# Patient Record
Sex: FEMALE | Race: WHITE | NOT HISPANIC OR LATINO | Employment: UNEMPLOYED | URBAN - NONMETROPOLITAN AREA
[De-identification: names, ages, dates, MRNs, and addresses within clinical notes are randomized per-mention and may not be internally consistent; named-entity substitution may affect disease eponyms.]

---

## 2017-06-28 ENCOUNTER — APPOINTMENT (OUTPATIENT)
Dept: OCCUPATIONAL MEDICINE | Facility: OTHER | Age: 27
End: 2017-06-28

## 2017-06-28 PROCEDURE — 99000 SPECIMEN HANDLING OFFICE-LAB: CPT

## 2017-09-10 ENCOUNTER — HEALTH MAINTENANCE LETTER (OUTPATIENT)
Age: 27
End: 2017-09-10

## 2019-03-12 ENCOUNTER — TRANSFERRED RECORDS (OUTPATIENT)
Dept: HEALTH INFORMATION MANAGEMENT | Facility: HOSPITAL | Age: 29
End: 2019-03-12

## 2019-03-12 LAB — PAP-ABSTRACT: NORMAL

## 2019-11-08 ENCOUNTER — HEALTH MAINTENANCE LETTER (OUTPATIENT)
Age: 29
End: 2019-11-08

## 2020-02-12 ENCOUNTER — OFFICE VISIT (OUTPATIENT)
Dept: PSYCHIATRY | Facility: OTHER | Age: 30
End: 2020-02-12
Attending: PSYCHIATRY & NEUROLOGY
Payer: COMMERCIAL

## 2020-02-12 VITALS
HEART RATE: 67 BPM | HEIGHT: 65 IN | BODY MASS INDEX: 26.46 KG/M2 | OXYGEN SATURATION: 97 % | DIASTOLIC BLOOD PRESSURE: 68 MMHG | TEMPERATURE: 98.1 F | SYSTOLIC BLOOD PRESSURE: 116 MMHG

## 2020-02-12 DIAGNOSIS — F33.1 MAJOR DEPRESSIVE DISORDER, RECURRENT EPISODE, MODERATE (H): Primary | ICD-10-CM

## 2020-02-12 DIAGNOSIS — F41.1 GAD (GENERALIZED ANXIETY DISORDER): ICD-10-CM

## 2020-02-12 DIAGNOSIS — F43.10 PTSD (POST-TRAUMATIC STRESS DISORDER): ICD-10-CM

## 2020-02-12 PROCEDURE — 99203 OFFICE O/P NEW LOW 30 MIN: CPT | Performed by: PSYCHIATRY & NEUROLOGY

## 2020-02-12 PROCEDURE — G0463 HOSPITAL OUTPT CLINIC VISIT: HCPCS

## 2020-02-12 RX ORDER — LAMOTRIGINE 25 MG/1
TABLET ORAL
Qty: 90 TABLET | Refills: 3 | Status: SHIPPED | OUTPATIENT
Start: 2020-02-12 | End: 2020-03-25

## 2020-02-12 RX ORDER — HYDROXYZINE HYDROCHLORIDE 10 MG/1
TABLET, FILM COATED ORAL
Qty: 60 TABLET | Refills: 3 | Status: SHIPPED | OUTPATIENT
Start: 2020-02-12 | End: 2020-03-25 | Stop reason: DRUGHIGH

## 2020-02-12 ASSESSMENT — ANXIETY QUESTIONNAIRES
3. WORRYING TOO MUCH ABOUT DIFFERENT THINGS: NEARLY EVERY DAY
7. FEELING AFRAID AS IF SOMETHING AWFUL MIGHT HAPPEN: MORE THAN HALF THE DAYS
6. BECOMING EASILY ANNOYED OR IRRITABLE: NEARLY EVERY DAY
1. FEELING NERVOUS, ANXIOUS, OR ON EDGE: MORE THAN HALF THE DAYS
5. BEING SO RESTLESS THAT IT IS HARD TO SIT STILL: SEVERAL DAYS
2. NOT BEING ABLE TO STOP OR CONTROL WORRYING: MORE THAN HALF THE DAYS
GAD7 TOTAL SCORE: 15
IF YOU CHECKED OFF ANY PROBLEMS ON THIS QUESTIONNAIRE, HOW DIFFICULT HAVE THESE PROBLEMS MADE IT FOR YOU TO DO YOUR WORK, TAKE CARE OF THINGS AT HOME, OR GET ALONG WITH OTHER PEOPLE: EXTREMELY DIFFICULT

## 2020-02-12 ASSESSMENT — PAIN SCALES - GENERAL: PAINLEVEL: NO PAIN (0)

## 2020-02-12 ASSESSMENT — PATIENT HEALTH QUESTIONNAIRE - PHQ9
5. POOR APPETITE OR OVEREATING: MORE THAN HALF THE DAYS
SUM OF ALL RESPONSES TO PHQ QUESTIONS 1-9: 16

## 2020-02-12 NOTE — NURSING NOTE
"Chief Complaint   Patient presents with     Consult     Mental health.  Medication management.  Anxiety, depression, Bipolar.       Initial /68 (BP Location: Right arm, Patient Position: Sitting, Cuff Size: Adult Regular)   Pulse 67   Temp 98.1  F (36.7  C) (Tympanic)   Ht 1.651 m (5' 5\")   SpO2 97%   BMI 26.46 kg/m   Estimated body mass index is 26.46 kg/m  as calculated from the following:    Height as of this encounter: 1.651 m (5' 5\").    Weight as of 2/13/15: 72.1 kg (159 lb).  Medication Reconciliation: complete  JOSE MEDRANO LPN    "
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
## 2020-02-13 ASSESSMENT — ANXIETY QUESTIONNAIRES: GAD7 TOTAL SCORE: 15

## 2020-02-23 ENCOUNTER — HEALTH MAINTENANCE LETTER (OUTPATIENT)
Age: 30
End: 2020-02-23

## 2020-03-24 NOTE — PROGRESS NOTES
"Dania Cutler is a 29 year old female who is being evaluated via a billable telephone visit.      The patient has been notified of following:     \"This telephone visit will be conducted via a call between you and your physician/provider. We have found that certain health care needs can be provided without the need for a physical exam.  This service lets us provide the care you need with a short phone conversation.  If a prescription is necessary we can send it directly to your pharmacy.  If lab work is needed we can place an order for that and you can then stop by our lab to have the test done at a later time.    If during the course of the call the physician/provider feels a telephone visit is not appropriate, you will not be charged for this service.\"     Dania Cutler complains of  No chief complaint on file.      I have reviewed and updated the patient's Past Medical History, Social History, Family History and Medication List.    ALLERGIES  Amoxicillin; Ceclor  [cefaclor]; and Septra [bactrim]      Phone call duration: 10 minutes  [phone]      SUBJECTIVE / INTERIM HISTORY                                                                        Social- works at [employer].  time with her kids, cooking new recipes, more stuff in summer like going to the beach. 2 cats.  Children- 10, 8, 5, and 4 months  Last visit 2/12/20 which was initial visit:  Start Lamictal 25 mg daily for 2 weeks; then increase to 50 mg daily for 2 weeks; then increase to 75 mg daily. Hydroxyzine 10 - 20 mg up to 2 times daily as needed for anxiety.  - is liking Lamictal and feels is helping. Now up to 75 mg daily of Lamictal and feels is helping her mood.   - doesn't feel hydroxyzine helping. In fact like it's not doing anything (inlcluding no SEs)  - sister committed suicide 2 weeks prior to our initial visit. Interestingly having to be home with her kids during this pandemic and kids home from school has been a good thing " (more time to think, more time to grieve)  - works with Dr. Jurado for therapy but hasn't seen him since prior to our initial visit    SUBSTANCE USE- Alcohol abuse in past , doesn't drink now    SYMPTOMS-   Depression:  Sx are better: depressed mood, anhedonia, low energy, appetite changes, weight changes, poor concentration /memory, feeling worthless and excessive guilt  Mary/Hypomania: NOT currently but has had increased energy and inter-episode mood instability  Anxiety:  excessive worry, feeling fearful and nervous/overwhelmed    Trauma-related:  intrusive memories, trauma trigger psychological / physiological response, angry outbursts, startle response, hypervigilance and fear      MEDICAL ROS- no rash.  negative including no issues with constipation, diarrhea, abdominal pain, nausea. No issues with headaches    MEDICAL / SURGICAL HISTORY                pregnant [if applicable]--no     Patient Active Problem List   Diagnosis     Supervision of other high-risk pregnancy     Vaginal bleeding     Mild dysplasia of cervix     Noncompliant pregnant patient     Need for prophylactic vaccination and inoculation against influenza (FLU)     Need for Tdap vaccination     Low TSH level     Low-lying placenta     Drug use     ALLERGY   Amoxicillin; Ceclor  [cefaclor]; and Septra [bactrim]  MEDICATIONS                                                                                             Current Outpatient Medications   Medication Sig     hydrOXYzine (ATARAX) 10 MG tablet Take 1 to 2 tabs up to 2 times daily as needed for anxiety     lamoTRIgine (LAMICTAL) 25 MG tablet Take 1 tab daily for 2 weeks; then take 2 tabs daily for 2 weeks; then take 3 tabs daily     No current facility-administered medications for this visit.          PAST MEDICATION TRIALS    Zoloft (sertraline).   no older antidepresants.   no antipsychotics.   Xanax (alprazolam) and Ativan (lorazepam).   Benadryl (diphenhydramine), Unisom (doxylamine)  and Desyrel (trazadone). TRAZODONE caused nightmares       VITALS   There were no vitals taken for this visit.     PHQ9                     [unfilled]  LABS                                                                                                                         CBC RESULTS:   Recent Labs   Lab Test 06/04/14   WBC 7.8   HGB 11.5*   HCT 35.1   MCV 89.0   MCH 29.3   MCHC 32.9   RDW 15.0          MENTAL STATUS EXAM                                                                                       Mood was anxious. . Thought process, including associations, was unremarkable and thought content was devoid of suicidal and homicidal ideation and psychotic thought. No hallucinations. Insight was good. Judgment was intact and adequate for safety. Fund of knowledge was intact. Pt demonstrates no obvious problems with attention, concentration, language, recent or remote memory although these were not formally tested.     ASSESSMENT                                                                                                      HISTORICAL:  Initial psych note 2/12/2020         NOTES:      Dania is a 28 yo who notes primary issues of anxiety, irritability, and mood lability. She is working with Dr. Jurado for therapy - he as well as other therapists she has worked with have felt she likely has bipolar disorder. Dania and I today reviewed her history and we discussed I do feel she has symptoms and history that warrant dx of PTSD. I'm uncertain as to if she has bipolar disorder but she she does have symptoms for certain of depression. We reviewed medications and we ultimately agreed on mood stabilizer given she does have some sx of manic episodes and family hx of bipolar disorder. We agreed on Lamictal and reviewed the taper given risk of Gab [address] and hence needs to contact me and / or go in for any type of rash. Is helping! Hydroxyzine isn't however we started very low so I do think is  worth trying an increase in dose. She was agreeable to this.         TREATMENT RISK STATEMENT:  The risks, benefits, alternatives and potential adverse effects have been explained and are understood by the pt.  The pt agrees to the treatment plan with the ability to do so.   The pt knows to call the clinic for any problems or access emergency care if needed.        DIAGNOSES                        PTSD  Mood disorder unspecified (MDD vs. Bipolar disorder)      PLAN                                                                                                                    1)  MEDICATIONS:         --Continue Lamictal  75 mg daily. Increase hydroxyzine from 10 mg to 20 mg to instead of 25 mg tab with directions take 1-2 (25 mg to 50 mg) up      2)  THERAPY:  No Change    3)  LABS:  None    4)  PT MONITOR [call for probs]:  Worsening symptoms, SI/HI, SEs from meds    5)  REFERRALS [CD, medical, other]:  None    6)  RTC: ~1 month

## 2020-03-25 ENCOUNTER — VIRTUAL VISIT (OUTPATIENT)
Dept: PSYCHIATRY | Facility: OTHER | Age: 30
End: 2020-03-25
Attending: PSYCHIATRY & NEUROLOGY
Payer: COMMERCIAL

## 2020-03-25 DIAGNOSIS — F41.1 GAD (GENERALIZED ANXIETY DISORDER): Primary | ICD-10-CM

## 2020-03-25 DIAGNOSIS — F33.1 MAJOR DEPRESSIVE DISORDER, RECURRENT EPISODE, MODERATE (H): ICD-10-CM

## 2020-03-25 PROCEDURE — 99212 OFFICE O/P EST SF 10 MIN: CPT | Performed by: PSYCHIATRY & NEUROLOGY

## 2020-03-25 RX ORDER — MULTIPLE VITAMINS W/ MINERALS TAB 9MG-400MCG
1 TAB ORAL DAILY
COMMUNITY

## 2020-03-25 RX ORDER — LAMOTRIGINE 25 MG/1
75 TABLET ORAL DAILY
Qty: 90 TABLET | Refills: 3 | Status: SHIPPED | OUTPATIENT
Start: 2020-03-25 | End: 2021-04-29

## 2020-03-25 RX ORDER — HYDROXYZINE HYDROCHLORIDE 25 MG/1
TABLET, FILM COATED ORAL
Qty: 120 TABLET | Refills: 4 | Status: SHIPPED | OUTPATIENT
Start: 2020-03-25 | End: 2020-11-12

## 2020-03-25 ASSESSMENT — ANXIETY QUESTIONNAIRES
3. WORRYING TOO MUCH ABOUT DIFFERENT THINGS: SEVERAL DAYS
GAD7 TOTAL SCORE: 10
5. BEING SO RESTLESS THAT IT IS HARD TO SIT STILL: SEVERAL DAYS
2. NOT BEING ABLE TO STOP OR CONTROL WORRYING: SEVERAL DAYS
6. BECOMING EASILY ANNOYED OR IRRITABLE: NEARLY EVERY DAY
7. FEELING AFRAID AS IF SOMETHING AWFUL MIGHT HAPPEN: SEVERAL DAYS
1. FEELING NERVOUS, ANXIOUS, OR ON EDGE: MORE THAN HALF THE DAYS

## 2020-03-25 ASSESSMENT — PATIENT HEALTH QUESTIONNAIRE - PHQ9
5. POOR APPETITE OR OVEREATING: SEVERAL DAYS
SUM OF ALL RESPONSES TO PHQ QUESTIONS 1-9: 8

## 2020-03-25 NOTE — PROGRESS NOTES
"Dania Cutler is a 29 year old female who is being evaluated via a telephone visit.      The patient has been notified of following (by ESME Cleary LPN     \"We have found that certain health care needs can be provided without the need for a physical exam.  This service lets us provide the care you need with a short phone conversation.  If a prescription is necessary we can send it directly to your pharmacy.  If lab work is needed we can place an order for that and you can then stop by our lab to have the test done at a later time.    This telephone visit will be conducted via 3 way call with the you (the patient) , the physician/provider, and a me all on the line at the same time.  This allows your physician/provider to have the phone conversation with you while I will be taking notes for your medical record.  We will have full access to your [facility] medical record during this entire phone call.    Since this is like an office visit,  will bill your insurance company for this service.  Please check with your medical insurance if this type of telephone/virtual is covered . You may be responsible for the cost of this service if insurance coverage is denied.  The typical cost is $30 (10min), $59(11-20min) and $85 (21-30min)     If during the course of the call the physician/provider feels a telephone visit is not appropriate, you will not be charged for this service\"    Consent has been obtained for this service by care team member: yes.  See the scanned image in the medical record.    Total time of call between patient and provider was 10 minutes     Sheryl Calle (MD signature)  ===================================================    I have reviewed the note as documented above.  This accurately captures the substance of my conversation with the patient,                "

## 2020-03-26 ASSESSMENT — ANXIETY QUESTIONNAIRES: GAD7 TOTAL SCORE: 10

## 2020-04-28 ENCOUNTER — TELEPHONE (OUTPATIENT)
Dept: PSYCHIATRY | Facility: OTHER | Age: 30
End: 2020-04-28

## 2020-04-28 ENCOUNTER — APPOINTMENT (OUTPATIENT)
Dept: PSYCHIATRY | Facility: OTHER | Age: 30
End: 2020-04-28
Attending: PSYCHIATRY & NEUROLOGY
Payer: COMMERCIAL

## 2020-04-28 NOTE — TELEPHONE ENCOUNTER
Sheryl,  I have been trying to call this patient for 2 hours now. The voice mail comes on stating it is full. I am unable to leave a message. I can open the chart for you if you would like to try, but I won't be able to go through the questionnaires with the patient.

## 2020-06-19 PROBLEM — F31.9 BIPOLAR AFFECTIVE DISORDER, CURRENTLY ACTIVE (H): Status: ACTIVE | Noted: 2020-02-12

## 2020-06-19 PROBLEM — F41.1 GAD (GENERALIZED ANXIETY DISORDER): Status: ACTIVE | Noted: 2020-02-12

## 2020-06-19 PROBLEM — F33.1 MAJOR DEPRESSIVE DISORDER, RECURRENT EPISODE, MODERATE (H): Status: ACTIVE | Noted: 2020-02-12

## 2020-11-12 ENCOUNTER — AMBULATORY - HEALTHEAST (OUTPATIENT)
Dept: BEHAVIORAL HEALTH | Facility: CLINIC | Age: 30
End: 2020-11-12

## 2020-11-12 ENCOUNTER — TELEPHONE (OUTPATIENT)
Dept: BEHAVIORAL HEALTH | Facility: CLINIC | Age: 30
End: 2020-11-12

## 2020-11-12 ENCOUNTER — HOSPITAL ENCOUNTER (EMERGENCY)
Facility: CLINIC | Age: 30
Discharge: ANOTHER HOSPITAL | End: 2020-11-13
Attending: EMERGENCY MEDICINE | Admitting: EMERGENCY MEDICINE
Payer: COMMERCIAL

## 2020-11-12 DIAGNOSIS — R45.851 SUICIDAL IDEATION: ICD-10-CM

## 2020-11-12 LAB
AMPHETAMINES UR QL SCN: NEGATIVE
BARBITURATES UR QL: NEGATIVE
BENZODIAZ UR QL: NEGATIVE
CANNABINOIDS UR QL SCN: POSITIVE
COCAINE UR QL: NEGATIVE
ETHANOL UR QL SCN: NEGATIVE
HCG UR QL: NEGATIVE
OPIATES UR QL SCN: NEGATIVE

## 2020-11-12 PROCEDURE — 80320 DRUG SCREEN QUANTALCOHOLS: CPT | Performed by: EMERGENCY MEDICINE

## 2020-11-12 PROCEDURE — 80307 DRUG TEST PRSMV CHEM ANLYZR: CPT | Performed by: EMERGENCY MEDICINE

## 2020-11-12 PROCEDURE — C9803 HOPD COVID-19 SPEC COLLECT: HCPCS | Performed by: EMERGENCY MEDICINE

## 2020-11-12 PROCEDURE — 99285 EMERGENCY DEPT VISIT HI MDM: CPT | Mod: 25 | Performed by: EMERGENCY MEDICINE

## 2020-11-12 PROCEDURE — U0003 INFECTIOUS AGENT DETECTION BY NUCLEIC ACID (DNA OR RNA); SEVERE ACUTE RESPIRATORY SYNDROME CORONAVIRUS 2 (SARS-COV-2) (CORONAVIRUS DISEASE [COVID-19]), AMPLIFIED PROBE TECHNIQUE, MAKING USE OF HIGH THROUGHPUT TECHNOLOGIES AS DESCRIBED BY CMS-2020-01-R: HCPCS | Performed by: EMERGENCY MEDICINE

## 2020-11-12 PROCEDURE — 99285 EMERGENCY DEPT VISIT HI MDM: CPT | Performed by: EMERGENCY MEDICINE

## 2020-11-12 PROCEDURE — 90791 PSYCH DIAGNOSTIC EVALUATION: CPT

## 2020-11-12 PROCEDURE — 81025 URINE PREGNANCY TEST: CPT | Performed by: EMERGENCY MEDICINE

## 2020-11-12 RX ORDER — GABAPENTIN 300 MG/1
600 CAPSULE ORAL 3 TIMES DAILY
COMMUNITY
End: 2021-04-29

## 2020-11-13 VITALS
SYSTOLIC BLOOD PRESSURE: 121 MMHG | RESPIRATION RATE: 16 BRPM | TEMPERATURE: 96.7 F | DIASTOLIC BLOOD PRESSURE: 78 MMHG | HEART RATE: 76 BPM | OXYGEN SATURATION: 100 %

## 2020-11-13 LAB
LABORATORY COMMENT REPORT: NORMAL
SARS-COV-2 RNA SPEC QL NAA+PROBE: NEGATIVE
SARS-COV-2 RNA SPEC QL NAA+PROBE: NORMAL
SPECIMEN SOURCE: NORMAL
SPECIMEN SOURCE: NORMAL

## 2020-11-13 RX ORDER — GABAPENTIN 600 MG/1
600 TABLET ORAL 3 TIMES DAILY
Status: DISCONTINUED | OUTPATIENT
Start: 2020-11-13 | End: 2020-11-13 | Stop reason: HOSPADM

## 2020-11-13 NOTE — ED NOTES
ED to Behavioral Floor Handoff    SITUATION  Dania Cutler is a 30 year old female who speaks English and lives in a home with minor children only The patient arrived in the ED by private car from home with a complaint of Suicidal (intrusive thoughts of suicide since her sister completed suicide this past January and pt found her body; pt says she is afraid she might hurt herself impulsively but doesn't want to b/c she has 4 kids)  .The patient's current symptoms started/worsened 11 months ago and during this time the symptoms have increased.   In the ED, pt was diagnosed with   Final diagnoses:   Suicidal ideation        Initial vitals were: BP: 123/67  Pulse: 103  Temp: 97.6  F (36.4  C)  Resp: 14  SpO2: 99 %   --------  Is the patient diabetic? No   If yes, last blood glucose? --     If yes, was this treated in the ED? --  --------  Is the patient inebriated (ETOH) No or Impaired on other substances? No  MSSA done? N/A  Last MSSA score: --    Were withdrawal symptoms treated? N/A  Does the patient have a seizure history? No. If yes, date of most recent seizure--  --------  Is the patient patient experiencing suicidal ideation? reports the following suicide factors: suicidal thoughts with thoughts to cut throat    Homicidal ideation? denies current or recent homicidal ideation or behaviors.    Self-injurious behavior/urges? denies current or recent self injurious behavior or ideation.  ------  Was pt aggressive in the ED No  Was a code called No  Is the pt now cooperative? Yes  -------  Meds given in ED: Medications - No data to display   Family present during ED course? No  Family currently present? No    BACKGROUND  Does the patient have a cognitive impairment or developmental disability? No  Allergies:   Allergies   Allergen Reactions     Amoxicillin      Ceclor  [Cefaclor]      Septra [Bactrim]      Sulfamethoxazole W/Trimethoprim      Bactrim   .   Social demographics are   Social History     Socioeconomic  History     Marital status:      Spouse name: None     Number of children: None     Years of education: None     Highest education level: None   Occupational History     None   Social Needs     Financial resource strain: None     Food insecurity     Worry: None     Inability: None     Transportation needs     Medical: None     Non-medical: None   Tobacco Use     Smoking status: Former Smoker     Types: Cigarettes     Smokeless tobacco: Never Used     Tobacco comment: 1/2+  ppd   Substance and Sexual Activity     Alcohol use: No     Drug use: Yes     Types: Marijuana     Sexual activity: Yes     Partners: Male     Birth control/protection: Condom     Comment: previously on Depo-Provera   Lifestyle     Physical activity     Days per week: None     Minutes per session: None     Stress: None   Relationships     Social connections     Talks on phone: None     Gets together: None     Attends Sikhism service: None     Active member of club or organization: None     Attends meetings of clubs or organizations: None     Relationship status: None     Intimate partner violence     Fear of current or ex partner: None     Emotionally abused: None     Physically abused: None     Forced sexual activity: None   Other Topics Concern     Parent/sibling w/ CABG, MI or angioplasty before 65F 55M? No   Social History Narrative     None        ASSESSMENT  Labs results   Labs Ordered and Resulted from Time of ED Arrival Up to the Time of Departure from the ED   DRUG ABUSE SCREEN 6 CHEM DEP URINE ([facility]) - Abnormal; Notable for the following components:       Result Value    Cannabinoids Qual Urine Positive (*)     All other components within normal limits   HCG QUALITATIVE URINE      Imaging Studies: No results found for this or any previous visit (from the past 24 hour(s)).   Most recent vital signs /67   Pulse 92   Temp 97.6  F (36.4  C) (Tympanic)   Resp 14   SpO2 97%    Abnormal labs/tests/findings requiring  intervention:---   Pain control: pt had none  Nausea control: pt had none    RECOMMENDATION  Are any infection precautions needed (MRSA, VRE, etc.)? No If yes, what infection? --  ---  Does the patient have mobility issues? independently. If yes, what device does the pt use? ---  ---  Is patient on 72 hour hold or commitment? No If on 72 hour hold, have hold and rights been given to patient? No  Are admitting orders written if after 10 p.m. ?No  Tasks needing to be completed:---     Dasia Fuentes, RN    [phone] [place] [facility]   [phone]
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
## 2020-11-13 NOTE — ED NOTES
Dania Cutler  November 12, 2020    Pt being voluntarily hospitalized for suicidal urges and increased depression and anxiety, no history of attempts, increasing urges to cut her throat, seeking help voluntarily, denies violent ideation, history of hitting her head, denies recent SIB.    For additional details see full DEC assessment.       Herman Latham
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
## 2020-11-19 ENCOUNTER — COMMUNICATION - HEALTHEAST (OUTPATIENT)
Dept: SCHEDULING | Facility: CLINIC | Age: 30
End: 2020-11-19

## 2020-11-20 ENCOUNTER — COMMUNICATION - HEALTHEAST (OUTPATIENT)
Dept: BEHAVIORAL HEALTH | Facility: CLINIC | Age: 30
End: 2020-11-20

## 2020-11-20 ENCOUNTER — COMMUNICATION - HEALTHEAST (OUTPATIENT)
Dept: SCHEDULING | Facility: CLINIC | Age: 30
End: 2020-11-20

## 2020-12-06 ENCOUNTER — HEALTH MAINTENANCE LETTER (OUTPATIENT)
Age: 30
End: 2020-12-06

## 2021-04-27 ENCOUNTER — HOSPITAL ENCOUNTER (OUTPATIENT)
Dept: BEHAVIORAL HEALTH | Facility: CLINIC | Age: 31
Discharge: HOME | End: 2021-04-27
Attending: FAMILY MEDICINE | Admitting: FAMILY MEDICINE
Payer: COMMERCIAL

## 2021-04-27 PROCEDURE — 90791 PSYCH DIAGNOSTIC EVALUATION: CPT | Mod: 95 | Performed by: COUNSELOR

## 2021-04-27 ASSESSMENT — COLUMBIA-SUICIDE SEVERITY RATING SCALE - C-SSRS
6. HAVE YOU EVER DONE ANYTHING, STARTED TO DO ANYTHING, OR PREPARED TO DO ANYTHING TO END YOUR LIFE?: NO
TOTAL  NUMBER OF INTERRUPTED ATTEMPTS LIFETIME: YES
TOTAL  NUMBER OF ABORTED OR SELF INTERRUPTED ATTEMPTS PAST 3 MONTHS: NO
TOTAL  NUMBER OF INTERRUPTED ATTEMPTS PAST 3 MONTHS: 1
ATTEMPT LIFETIME: NO
4. HAVE YOU HAD THESE THOUGHTS AND HAD SOME INTENTION OF ACTING ON THEM?: YES
5. HAVE YOU STARTED TO WORK OUT OR WORKED OUT THE DETAILS OF HOW TO KILL YOURSELF? DO YOU INTEND TO CARRY OUT THIS PLAN?: YES
5. HAVE YOU STARTED TO WORK OUT OR WORKED OUT THE DETAILS OF HOW TO KILL YOURSELF? DO YOU INTEND TO CARRY OUT THIS PLAN?: YES
3. HAVE YOU BEEN THINKING ABOUT HOW YOU MIGHT KILL YOURSELF?: YES
ATTEMPT PAST THREE MONTHS: NO
4. HAVE YOU HAD THESE THOUGHTS AND HAD SOME INTENTION OF ACTING ON THEM?: YES
6. HAVE YOU EVER DONE ANYTHING, STARTED TO DO ANYTHING, OR PREPARED TO DO ANYTHING TO END YOUR LIFE?: NO
TOTAL  NUMBER OF INTERRUPTED ATTEMPTS PAST 3 MONTHS: NO
2. HAVE YOU ACTUALLY HAD ANY THOUGHTS OF KILLING YOURSELF LIFETIME?: YES
TOTAL  NUMBER OF ABORTED OR SELF INTERRUPTED ATTEMPTS PAST LIFETIME: YES
1. IN THE PAST MONTH, HAVE YOU WISHED YOU WERE DEAD OR WISHED YOU COULD GO TO SLEEP AND NOT WAKE UP?: YES

## 2021-04-27 NOTE — PROGRESS NOTES
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 911 or go to my nearest emergency department.     I helped develop this safety plan and agree to use it when needed.  I have been given a copy of this plan.        Today s date:  4/27/2021  Adapted from Safety Plan Template 2008 Vida Roque and Carroll Coyle is reprinted with the express permission of the authors.  No portion of the Safety Plan Template may be reproduced without the express, written permission.  You can contact the authors at [email or web address] or [email or web address]

## 2021-04-27 NOTE — PATIENT INSTRUCTIONS
Tuesday, April 27, 2021  Dania Cutler  [address] [address]    Dear Dania,  This is a reminder that an appointment for mental health services has been scheduled for you.  Please contact your insurance company directly to verify coverage, eligibility, payment, and  benefit information.    Appointment Date: 4/28/2021  Appointment Time: 11:00 AM  Location: [facility]  Xenia Khan  [address] [place] [address] [address] [place], [place] [address] [phone]    For directions and/or questions regarding the appointment, please contact the clinic or provider  directly at the phone number listed above.    Your appointment is scheduled outside of [facility]. Behavioral Healthcare  Providers ([facility]) maintains an extensive network of licensed behavioral health providers to  connect patients with the services they need. We do not charge providers a fee to participate  in our referral network. We match patients with providers based on a patient s specific  treatment needs, insurance coverage, and location. Our first effort will be to refer you to a  provider within your care system and will utilize providers outside of your care system as  needed.    Sincerely,  [facility] Behavioral Health Access Staff  [facility]  [address] [place] [id number]  [phone]        Welcome to the Adult Mental Health Outpatient Programs. Thank you for choosing us at [facility]!     Managing mental health symptoms while balancing life stressors can be a struggle. Our mental health programming will provide you the therapy, education, skills and support needed to improve your well-being and to live a healthy and more manageable lifestyle.  After completing the Diagnostic Assessment, you will receive a recommendation for a level of care or specialty service that is right for you. Our Outpatient Mental Health programs are all group-based and allow you to meet with peers who are  trying to manage similar symptoms or life circumstances in a safe and therapeutic setting.  Program Recommendations for: Day Treatment  You will be scheduled to join the following program: Adult Day Treatment Program  You will be in the follow group /track:  Waitlist  Please attend:  Choose an item.  at:  Choose an item.  Start date:  Program staff will contact you directly with a start date.   What will happen next?      You will receive a series of calls from our [facility] providers and/or schedulers to prepare you for your program participation.    1. Admission Call: Program staff will contact you after your diagnostic assessment to provide a brief check in and to complete the admission process. We will ask you about concerns that may need to be addressed right away. This could include: personal safety, insurance clarification, technology access, or another concern you may have. This call may occur days in advance or right before your first scheduled group.    2. Physical Health Screen Call:  A nurse will contact you either prior to admission or during your first week in the program to ask a few required physical health screening questions and discuss concerns as needed.   3. Provider/Scheduling Call: Program staff may also call to schedule an individual appointment with a psychiatrist or psychologist (therapist). This will depend on your needs and may be required for the program that was recommended for you. This program requirement does NOT replace your follow up with your community provider.  However, it is important that you do NOT see your community psychiatric provider on the same day that you see the program psychiatrist. If you do, this could result in a denial from your insurance. Please let us know if you have any concerns and we will help you.   4. Disability or FMLA paperwork requests: Please coordinate with your community provider to complete paperwork. This will be easiest for you. Most of  "the time, they want the same provider to follow you during your time off. If you do not have a community provider, please schedule an appointment with one as soon as possible. The Partial Hospitalization Program provider may assist with paperwork if you have not already established care in the community. However, this is a short-term program and responsibility for paperwork must transfer to another provider when you discharge from the Partial Hospitalization Program. We do NOT have a provider to complete paperwork in Adult Day Treatment, Adult Dual Disorder Program or 55+ Program at this time.   5. WAIT LIST: If you are placed on the Wait List following your diagnostic assessment, you will receive a phone call within a few days to discuss a tentative start date and plan.  Please contact us at the phone number listed below at any time with additional questions or if you are choosing to be removed from the Wait List.   What if I still have questions or cannot attend as planned? :  Adult Day Treatment [phone].     We hope to be able to answer your questions during the admission call. You can also reach out to us by contacting the program directly at:  Adult Day Treatment [phone].     Sincerely,   Your Outpatient Adult Mental Health Program Team    MY COPING PLAN FOR SAFETY     PATIENT'S NAME:    Dania Cutler  MRN:                           [id number]     SAFETY PLAN:     Step 1: Warning signs / cues (Thoughts, images, mood, situation, behavior) that a crisis may be developing:     ? Thoughts: \"I don't matter\", \"People would be better off without me\", \"I'm a burden\", \"I can't do this anymore\", \"I just want this to end\" and \"Nothing makes it better\"  ? Images: obsessive thoughts of death or dying: and flashbacks  ? Thinking Processes: ruminations (can't stop thinking about my problems):racing thoughts, intrusive thoughts (bothersome, unwanted thoughts that come out of nowhere):  highly critical and negative " "thoughts:  disorganized thinking and paranoia:   ? Mood: worsening depression, hopelessness, helplessness, intense anger, intense worry, agitation and mood swings  ? Behaviors: isolating/withdrawing , using drugs, can't stop crying, impulsive, reckless behaviors (acting without thinking):saying good-bye, aggression, not taking care of myself, not sleeping enough and increasing frequency and duration of dissociation  ? Situations: relationship problems, trauma  and financial stress   ?    Step 2: Coping strategies - Things I can do to take my mind off of my problems without contacting another person (relaxation technique, physical activity):     ? Distress Tolerance Strategies:  arts and crafts: meditate and watch tv.  ? Physical Activities: go for a walk and meditation  ? Focus on helpful thoughts:  \"This is temporary\", \"I will get through this\" and \"It always passes\"     Step 3: People and social settings that provide distraction:                 Name: Children and boyfriend, but he is going through a lot of mental health stuff also                                 park and camping and swimming            Step 4: Remind myself of people and things that are important to me and worth living for:  My children     Step 5: When I am in crisis, I can ask these people to help me use my safety plan:                 Name: Cortes ruano           Phone: [phone]        Step 6: Making the environment safe:      ? remove drugs and remove things I could use to hurt myself:     Step 7: Professionals or agencies I can contact during a crisis:     ? Suicide Prevention Lifeline: 1-099-223-TALK (8481)  ? Crisis Text Line Service (available 24 hours a day, 7 days a week): Text [place] to [address]1939  ? Call  **CRISIS (059953) from a cell phone to talk to a team of professionals who can help you.          Crisis Services By [facility]: Phone Number:   Amy     [phone]   [place]    [phone]   [place]    [phone]   [place]" [phone]   [place]    [phone]   [place] [phone]   [place]     [phone]      ? Call 911 or go to my nearest emergency department.             I helped develop this safety plan and agree to use it when needed.  I have been given a copy of this plan.          Today s date:  4/27/2021  Adapted from Safety Plan Template 2008 Vida Roque and Carroll Cyole is reprinted with the express permission of the authors.  No portion of the Safety Plan Template may be reproduced without the express, written permission.  You can contact the authors at [email or web address] or [email or web address]

## 2021-04-28 ASSESSMENT — PATIENT HEALTH QUESTIONNAIRE - PHQ9: SUM OF ALL RESPONSES TO PHQ QUESTIONS 1-9: 21

## 2021-04-28 NOTE — PROGRESS NOTES
Dania is a 30 year old who is being evaluated via a billable video visit.      How would you like to obtain your AVS? MyChart  If the video visit is dropped, the invitation should be resent by: Send to e-mail at: [email or web address]  Will anyone else be joining your video visit? No    Video Start Time: 1100  Patient is new to our clinic, here to establish care.   Requesting medications to help her wean off from Marijuana so that she can attend treatment at an outpatient facility in one month.     Subjective   Dania is a 30 year old who presents for the following health issues  HPI   Cannaboid hyperemesis syndrome: Dania is a generally healthy 30 year old with a complex mental health history. She has seen multiple people recently for medical treatment to help her stop smoking marijuana. She is currently unemployed. She lives with her boyfriend and her 4 children. She has a lot going on in her life. She found her sister  in January after a suicide. She smokes marijuana daily, in fact if she goes for more than 2 hours without smoking, she becomes very ill and cannot stop throwing up. She wants to quit smoking marijuana. She completed intake for a treatment center 2 days ago and will be able to start outpatient treatment in 1 month. Her goal is to be done smoking marijuana in one month. She feels like she needs medication (Ativan and Zofran) to help her with this. She is frustrated because she has seen many different people and no one is willing to help her.     PTSD/Generalized Anxiety Disorder:History of trauma related to sister's suicide. General anxiety for as long as she can remember. Will be seeing a therapist twice weekly, does not have a psychiatrist currently. Is not currently taking any medications. Medications tried in the past are Buspar, Lamictal, Gabapentin, Hydroxyzine, Zoloft. All of which have not been helpful.     Review of Systems   Constitutional, HEENT, cardiovascular, pulmonary, gi and gu  systems are negative, except as otherwise noted.  *Crying during interview  Objective         Vitals:  No vitals were obtained today due to virtual visit.    Physical Exam   GENERAL: Healthy, alert and no distress-sad  EYES: Eyes grossly normal to inspection.  No discharge or erythema, or obvious scleral/conjunctival abnormalities.  RESP: No audible wheeze, cough, or visible cyanosis.  No visible retractions or increased work of breathing.    SKIN: Visible skin clear. No significant rash, abnormal pigmentation or lesions.  NEURO: Cranial nerves grossly intact.  Mentation and speech appropriate for age.  PSYCH: Mentation appears normal, affect normal/bright, judgement and insight intact, normal speech and appearance well-groomed.    No Diagnostics completed today.     Video-Visit Details    Type of service:  Video Visit    Video End Time:1132 AM    Originating Location (pt. Location): Home    Distant Location (provider location):  Provider's home    Platform used for Video Visit: Votizen     Assessment & Plan     1.Cannabinoid hyperemesis syndrome  2.PTSD  3.Generalized Anxiety Disorder    First, will have telephone conference with Jag Boone regarding treatment plans for Dania. Next, I will collaborate with Psychiatry colleagues on possible evidence based protocols to treat Cannabinoid hyperemesis. Next, I will relay the information learned to Dania. Next, she will follow up in clinic per protocol guidelines. She verbalizes understanding of the plan.    West J Emerg Med  . 2018 Mar;19(2):380-386. doi: 10.5811/westjem.2017.11.30176. Epub 2017 Nov 8.   Results: Per the consensus guideline, treatment should focus on symptom relief and education on the need for cannabis cessation. Capsaicin is a readily available topical preparation that is reasonable to use as first-line treatment. Antipsychotics including haloperidol and olanzapine have been reported to provide complete symptom relief in limited case studies.  Conventional antiemetics including antihistamines, serotonin antagonists, dopamine antagonists and benzodiazepines may have limited effectiveness. Emergency physicians should avoid opioids if the diagnosis of CHS is certain and educate patients that cannabis cessation is the only intervention that will provide complete symptom relief.   Conclusion: An expert consensus treatment guideline is provided to assist with diagnosis and appropriate treatment of CHS. Clinicians and public health officials should identity and treat CHS patients with strategies that decrease exposure to opioids, minimize use of healthcare resources, and maximize patient safety.  BERNARDO Mcgill CNP  Addendum 05/06/2021 5859:Collaboration with Addiction. Discussion ensued regarding evidenced based protocol to treat CHS. Dania's symptoms do not completely fit the paradigm when reviewing the article HTTPS://pubmed.ncbi.nlm.nih.gov/45110739/  It's likely Dania could be an atypical presentation. Patient's often fall into cyclical GI symptoms; therefor a GI referral would not be a bad idea. Amitriptyline has shown positive effects in some patients. Initial treatment should be Hot showers which are often very effective. Capsaicin topically prn to abdomen has shown to be very effective as well (Rx sent).  Many times patients can often be treated as inpatient first especially if Dania's hydration status becomes compromised. There certainly could be other things happening here, however there are certainly correlations to the CHS. I will call Dania and share this information. An internal referral to an addiction fellow will be sent forward as well. Dania should follow up weekly on her status.   BERNARDO Mcgill CNP  Addendum 05/10/2021 3760: I was able to talk with Dania today about her plan. She is going to start Amitriptyline daily at bedtime. She will also implement hot showers and Capcaisin along with daily outpatient treatment classed  with the end goal to stop smoking marijuana. Finally, she will see GI in consult and follow up in our clinic in one week. She verbalizes understanding of the plan.   BERNARDO Mcgill, CNP

## 2021-04-29 ENCOUNTER — TELEPHONE (OUTPATIENT)
Dept: BEHAVIORAL HEALTH | Facility: CLINIC | Age: 31
End: 2021-04-29

## 2021-04-29 ENCOUNTER — VIRTUAL VISIT (OUTPATIENT)
Dept: FAMILY MEDICINE | Facility: CLINIC | Age: 31
End: 2021-04-29
Payer: COMMERCIAL

## 2021-04-29 DIAGNOSIS — F43.10 PTSD (POST-TRAUMATIC STRESS DISORDER): ICD-10-CM

## 2021-04-29 DIAGNOSIS — F12.90 CANNABINOID HYPEREMESIS SYNDROME: Primary | ICD-10-CM

## 2021-04-29 DIAGNOSIS — F41.1 GENERALIZED ANXIETY DISORDER: ICD-10-CM

## 2021-04-29 DIAGNOSIS — R11.2 CANNABINOID HYPEREMESIS SYNDROME: Primary | ICD-10-CM

## 2021-04-29 PROCEDURE — 99203 OFFICE O/P NEW LOW 30 MIN: CPT | Mod: 95 | Performed by: NURSE PRACTITIONER

## 2021-04-29 NOTE — PATIENT INSTRUCTIONS

## 2021-04-29 NOTE — TELEPHONE ENCOUNTER
Writer contact patient regarding her placement on the day treatment program wait list. Requested a call back to program line [phone] or direct line at [phone].     Darlin Krishna [facility] on 4/29/2021 at 2:22 PM

## 2021-05-07 ENCOUNTER — TELEPHONE (OUTPATIENT)
Dept: PSYCHIATRY | Facility: CLINIC | Age: 31
End: 2021-05-07

## 2021-05-07 ENCOUNTER — BEH TREATMENT PLAN (OUTPATIENT)
Dept: BEHAVIORAL HEALTH | Facility: CLINIC | Age: 31
End: 2021-05-07
Attending: PSYCHIATRY & NEUROLOGY

## 2021-05-07 ENCOUNTER — TELEPHONE (OUTPATIENT)
Dept: BEHAVIORAL HEALTH | Facility: CLINIC | Age: 31
End: 2021-05-07

## 2021-05-07 DIAGNOSIS — F33.2 MAJOR DEPRESSIVE DISORDER, RECURRENT EPISODE, SEVERE (H): ICD-10-CM

## 2021-05-07 NOTE — TELEPHONE ENCOUNTER
Writer spoke to Pt today about the ADT program and offered a start date for Tuesday, 5/11 in the ADT 2B track.  She accepted.  Answered program questions and completed the admission.  No immediate safety concerns noted. Will inform team of the admission.

## 2021-05-07 NOTE — PROGRESS NOTES
Admission Date: 5/7/2021    Identify any current concerns with potential impact to admission:     medication/medical concerns: Is under care of physician for cannabinoids syndrome. She has intention to stop use of marijuana and working with physician to help with symptoms of withdrawal since they are severe.  Agrees to not use before or during group while working through these symptoms.    immediate safety concerns:none    Does patient have safety plan? yes  Note: Please copy safety plan copied into BEH Encounter     Other (insurance/childcare/transportation/housing/planned absences/etc): none    Patient's insurance is: [employer] [place]    Does patient need appointment with provider? no    Review patient's program schedule and inform them of any variation due to late days or holidays.         Completed by: GENESIS Norman

## 2021-05-07 NOTE — TELEPHONE ENCOUNTER
PSYCHIATRY CLINIC PHONE INTAKE     SERVICES REQUESTED / INTERESTED IN          Other:  CDE Consult    Presenting Problem and Brief History                              What would you like to be seen for? (brief description):  Pt was diagnosed within in the last year. Not taking any medications. Pt has symptoms of her mental health diagnoses all the time. Pt is also experiencing symptoms of CHS (Cannabnoid Hypermesis Syndrome). Pt has issues with falling asleep and has lack of appetite.   Have you received a mental health diagnosis? Yes   Which one (s): BPD, Anxiety, Depression, and PTSD  Is there any history of developmental delay?  No   Are you currently seeing a mental health provider?  Yes            Who / month last seen:  Therapist at [facility] - Xenia Khan  Do you have mental health records elsewhere?  Yes  Will you sign a release so we can obtain them?  Yes    Have you ever been hospitalized for psychiatric reasons?  Yes  Describe:  Pt was hospitalized from Nov 2020 to Jan 2021 at [facility] and also had an inpt at  within the last year.     Do you have current thoughts of self-harm?  No    Do you currently have thoughts of harming others?  No       Substance Use History     Do you have any history of alcohol / illicit drug use?  Yes  Describe:  Marijuana  Have you ever received treatment for this?  No   Describe:  NA     Social History     Who is the patient's a guardian?  No    Name / number: NA  Have you had an ACT team in last 12 months?  No  Describe: NA   Do you have any current or past legal issues?  No Describe: NA  OK to leave a detailed voicemail?  Yes    Medical/ Surgical History                                   Patient Active Problem List   Diagnosis     Supervision of other high-risk pregnancy     Vaginal bleeding     Mild dysplasia of cervix     Noncompliant pregnant patient     Need for prophylactic vaccination and inoculation against influenza (FLU)     Need for Tdap  vaccination     Low TSH level     Low-lying placenta     Drug use     Bipolar affective disorder, currently active (H)     TERI (generalized anxiety disorder)     Major depressive disorder, recurrent episode, moderate (H)          Medications             Current Outpatient Medications   Medication Sig Dispense Refill     capsaicin-menthol-methyl sal 0.025-1-12 % external cream Apply to abdomen as needed. 60 g 1     multivitamin w/minerals (MULTI-VITAMIN) tablet Take 1 tablet by mouth daily           DISPOSITION      5/7/21 Intake complete. Sending to  for review. Pt is hoping to get scheduled sooner than June.    Cher Nunez,

## 2021-05-07 NOTE — PROGRESS NOTES
"Outpatient Mental Health Services - Adult     MY COPING PLAN FOR SAFETY     PATIENT'S NAME:    Dania Cutler  MRN:                           [id number]     SAFETY PLAN:     Step 1: Warning signs / cues (Thoughts, images, mood, situation, behavior) that a crisis may be developing:     ? Thoughts: \"I don't matter\", \"People would be better off without me\", \"I'm a burden\", \"I can't do this anymore\", \"I just want this to end\" and \"Nothing makes it better\"  ? Images: obsessive thoughts of death or dying: and flashbacks  ? Thinking Processes: ruminations (can't stop thinking about my problems):racing thoughts, intrusive thoughts (bothersome, unwanted thoughts that come out of nowhere):  highly critical and negative thoughts:  disorganized thinking and paranoia:   ? Mood: worsening depression, hopelessness, helplessness, intense anger, intense worry, agitation and mood swings  ? Behaviors: isolating/withdrawing , using drugs, can't stop crying, impulsive, reckless behaviors (acting without thinking):saying good-bye, aggression, not taking care of myself, not sleeping enough and increasing frequency and duration of dissociation  ? Situations: relationship problems, trauma  and financial stress   ?    Step 2: Coping strategies - Things I can do to take my mind off of my problems without contacting another person (relaxation technique, physical activity):     ? Distress Tolerance Strategies:  arts and crafts: meditate and watch tv.  ? Physical Activities: go for a walk and meditation  ? Focus on helpful thoughts:  \"This is temporary\", \"I will get through this\" and \"It always passes\"     Step 3: People and social settings that provide distraction:                 Name: Children and boyfriend, but he is going through a lot of mental health stuff also                                 park and camping and swimming            Step 4: Remind myself of people and things that are important to me and worth living for:  My " children     Step 5: When I am in crisis, I can ask these people to help me use my safety plan:                 Name: Cortes ruano           Phone: [phone]        Step 6: Making the environment safe:      ? remove drugs and remove things I could use to hurt myself:     Step 7: Professionals or agencies I can contact during a crisis:     ? Suicide Prevention Lifeline: 1-995-423-TALK (2215)  ? Crisis Text Line Service (available 24 hours a day, 7 days a week): Text [place] to 300367  ? Call  **CRISIS (198695) from a cell phone to talk to a team of professionals who can help you.          Crisis Services By [facility]: Phone Number:   Amy     [phone]   [place]    [phone]   [place]    [phone]   [place]    [phone]   [place]    [phone]   [place] [phone]   [place]     [phone]      ? Call 911 or go to my nearest emergency department.             I helped develop this safety plan and agree to use it when needed.  I have been given a copy of this plan.          Today s date:  4/27/2021  Adapted from Safety Plan Template 2008 Vida Roque and Carroll Coyle is reprinted with the express permission of the authors.  No portion of the Safety Plan Template may be reproduced without the express, written permission.  You can contact the authors at [email or web address] or [email or web address]

## 2021-05-11 ENCOUNTER — HOSPITAL ENCOUNTER (OUTPATIENT)
Dept: BEHAVIORAL HEALTH | Facility: CLINIC | Age: 31
End: 2021-05-11
Attending: PSYCHIATRY & NEUROLOGY
Payer: COMMERCIAL

## 2021-05-11 ENCOUNTER — TELEPHONE (OUTPATIENT)
Dept: GASTROENTEROLOGY | Facility: CLINIC | Age: 31
End: 2021-05-11

## 2021-05-11 PROBLEM — F33.2 MAJOR DEPRESSIVE DISORDER, RECURRENT EPISODE, SEVERE (H): Status: ACTIVE | Noted: 2021-05-11

## 2021-05-11 PROCEDURE — G0177 OPPS/PHP; TRAIN & EDUC SERV: HCPCS | Mod: 95

## 2021-05-11 PROCEDURE — 90853 GROUP PSYCHOTHERAPY: CPT | Mod: GT

## 2021-05-11 NOTE — GROUP NOTE
Process Group Note    PATIENT'S NAME: Dania Cutler  MRN:   [id number]  :   1990  ACCT. NUMBER: [id number]  DATE OF SERVICE: 21  START TIME:  9:00 AM  END TIME:  9:50 AM  FACILITATOR: Kell Adamson  TOPIC:  Process Group    Diagnoses:   296.33 (F33.2) Major Depressive Disorder, Recurrent Episode, Severe _.  4. Other Diagnoses that is relevant to services:   Substance-Related & Addictive Disorders 304.30 (F12.20) Cannabis Use Disorder Severe.      [facility] Mental Health Day Treatment  TRACK: 2B    Telemedicine Visit: The patient's condition can be safely assessed and treated via synchronous audio and visual telemedicine encounter.      Reason for Telemedicine Visit:  covid 19    Originating Site (Patient Location): Patient's home    Distant Site (Provider Location): Provider Remote Setting    Consent:  The patient/guardian has verbally consented to: the potential risks and benefits of telemedicine (video visit) versus in person care; bill my insurance or make self-payment for services provided; and responsibility for payment of non-covered services.     Mode of Communication:  Video Conference via [facility]    As the provider I attest to compliance with applicable laws and regulations related to telemedicine.      NUMBER OF PARTICIPANTS: 5          Data:    Session content: At the start of this group, patients were invited to check in by identifying themselves, describing their current emotional status, and identifying issues to address in this group.   Area(s) of treatment focus addressed in this session included Symptom Management, Personal Safety, Develop / Improve Independent Living Skills and Develop Socialization / Interpersonal Relationship Skills.  Dania reported having borderline, anxiety, and trauma.  She reported her sister completed suicide a year ago and feels borderline traits have been exacerbated over time.  She reported she has a toxic relationship with mom so she  cut her off.  Dania feels guilty for this but knows it was the healthy thing to do.  She reported having 4 kids of her own so she feels overwhelmed.  Her relationship with boyfriend is unstable.  She had been using marijuana to cope but no gets ill using it due to overuse.  She is working on finding other coping strategies.     Therapeutic Interventions/Treatment Strategies:  Psychotherapist offered support, feedback and validation and reinforced use of skills. Treatment modalities used include Motivational Interviewing, Cognitive Behavioral Therapy and Dialectical Behavioral Therapy. Validated and normalized.  Highlighted and reinforced skills used; connected to positive outcomes.  Provided additional skill suggestions.  Aided in creating a plan to help work towards goals.        Assessment:    Patient response:   Patient responded to session by accepting feedback, giving feedback, listening, focusing on goals, being attentive and accepting support    Possible barriers to participation / learning include: and no barriers identified    Health Issues:   Yes: Sleep disturbance, Associated Psychological Distress  Weight / dietary issues, Associated Psychological Distress       Substance Use Review:   Substance Use: cannabis .  and Last use: recent    Mental Status/Behavioral Observations  Appearance:   Appropriate   Eye Contact:   Good   Psychomotor Behavior: Normal   Attitude:   Cooperative   Orientation:   All  Speech   Rate / Production: Normal    Volume:  Normal   Mood:    Anxious  Depressed   Affect:    Appropriate   Thought Content:   Clear  Thought Form:  Coherent  Logical     Insight:    Good     Plan:     Safety Plan: No current safety concerns identified.  Recommended that patient call 911 or go to the local ED should there be a change in any of these risk factors.     Barriers to treatment: None identified    Patient Contracts (see media tab):  None    Substance Use: Not addressed in session     Continue or  Discharge: Patient will continue in Adult Day Treatment (ADT)  as planned. Patient is likely to benefit from learning and using skills as they work toward the goals identified in their treatment plan.      Kell Adamson  May 11, 2021

## 2021-05-11 NOTE — GROUP NOTE
Psychoeducation Group Note    PATIENT'S NAME: Dania Cutler  MRN:   [id number]  :   1990  ACCT. NUMBER: [id number]  DATE OF SERVICE: 21  START TIME: 10:00 AM  END TIME: 10:50 AM  FACILITATOR: Dasia Irby RN  TOPIC: MH RN Group: Health Maintenance  Telemedicine Visit: The patient's condition can be safely assessed and treated via synchronous audio and visual telemedicine encounter.      Reason for Telemedicine Visit:  covid19    Originating Site (Patient Location): Patient's home    Distant Site (Provider Location): Provider Remote Setting    Consent:  The patient/guardian has verbally consented to: the potential risks and benefits of telemedicine (video visit) versus in person care; bill my insurance or make self-payment for services provided; and responsibility for payment of non-covered services.     Mode of Communication:  Video Conference via zoom    As the provider I attest to compliance with applicable laws and regulations related to telemedicine.      [facility] Adult Mental Health Day Treatment  TRACK: 2B    NUMBER OF PARTICIPANTS: 6    Summary of Group / Topics Discussed:  Health Maintenance: Wellness Check-in: Patients met with group facilitator to individually review a holistic wellness check-in to assess patient medication adherence/concerns, appointments, physical and mental health, exercise, nutrition, sleep, socialization, substance use, and need for service/resource referrals.       Patient Session Goals / Objectives:    Discussed various aspects of health management and self-care related to physical and mental health    Demonstrated increased self-awareness of current wellness needs    Developed health literacy skills in navigating the healthcare system and self-advocacy/communicating needs with health care team          Patient Participation / Response:  Fully participated with the group by sharing personal reflections / insights and openly received / provided feedback with  other participants.    Demonstrated understanding of topics discussed through group discussion and participation and Identified / Expressed personal readiness to practice skills    Treatment Plan:  Patient has an initial individualized treatment plan that was created as part of their diagnostic assessment / admission process.  A master individualized treatment plan is in the process of being developed with the patient and multi-disciplinary care team.    Dasia Irby RN

## 2021-05-11 NOTE — TELEPHONE ENCOUNTER
[facility] Call Center    Phone Message    May a detailed message be left on voicemail: yes     Reason for Call: Other: Patient referred for Cannabinoid hyperemesis syndrome. Patient stated that it is causing difficulty eating and possible weight loss. Please review for further assessment.      Action Taken: Message routed to:  [facility] GI    Travel Screening: Not Applicable

## 2021-05-11 NOTE — GROUP NOTE
Psychoeducation Group Note    PATIENT'S NAME: Dania Cutler  MRN:   [id number]  :   1990  ACCT. NUMBER: [id number]  DATE OF SERVICE: 21  START TIME: 11:00 AM  END TIME: 11:50 AM  FACILITATOR: Harrison Moses OT  TOPIC: MH Life Skills Group: Lifestyle Balance and Structure  [facility] Adult Mental Health Day Treatment  TRACK: 2B    Telemedicine Visit: The patient's condition can be safely assessed and treated via synchronous audio and visual telemedicine encounter.      Reason for Telemedicine Visit: Services only offered telehealth and Covid 19    Originating Site (Patient Location): Patient's home    Distant Site (Provider Location): Provider Remote Setting    Consent:  The patient/guardian has verbally consented to: the potential risks and benefits of telemedicine (video visit) versus in person care; bill my insurance or make self-payment for services provided; and responsibility for payment of non-covered services.     Mode of Communication:  Video Conference via Medical Zoom    As the provider I attest to compliance with applicable laws and regulations related to telemedicine.      NUMBER OF PARTICIPANTS: 6    Summary of Group / Topics Discussed:  Lifestyle Balance and Strucure:  Time Management: Motivation - Intrinsic and Extrinsic Motivators: Patients were taught and applied skills and strategies to effectively manage their time, energy, and resources.  Patients discussed motivation as it relates to mental health symptoms and shared individual challenges to engaging in and accomplishing meaningful activities of daily living.  Patients were educated on intrinsic and extrinsic motivation and provided with strategies on how to use these motivators to accomplish activities.     Patient Session Goals / Objectives:    Identified how mental health symptoms impact motivation and can lead to difficulty completing activities of daily living      Identified tasks they are having difficulty engaging in  and completing and at least one intrinsic and one extrinsic motivator to try to help improve follow through            Patient Participation / Response:  Fully participated with the group by sharing personal reflections / insights and openly received / provided feedback with other participants.    Verbalized understanding of content and Patient would benefit from additional opportunities to practice the content to be able to generalize it to their everyday life with increased intentionality, consistency, and efficacy in support of their psychiatric recovery    Treatment Plan:  Patient has an initial individualized treatment plan that was created as part of their diagnostic assessment / admission process.  A master individualized treatment plan is in the process of being developed with the patient and multi-disciplinary care team.    Harrison Moses, OT

## 2021-05-12 NOTE — TELEPHONE ENCOUNTER
Attempted to reach patient, detailed message left advising unsure of intent of message received.  Advised unable to provide medical advice until established in clinic, however if the intent was to see if earlier appointment is available, requested return call if interested in scheduling sooner with Radhames Mcfadden PA-C (advised may be seen 6/21/2021). Requested return call if interested.    Jaida Laws RN

## 2021-05-13 NOTE — TELEPHONE ENCOUNTER
My Chart message to patient offering earlier appointmentt with Radhames Mcfadden PA-C on 6/21/2021 at 2 PM, advised will hold until end of day.      Jaida Laws RN

## 2021-05-14 ENCOUNTER — TELEPHONE (OUTPATIENT)
Dept: BEHAVIORAL HEALTH | Facility: CLINIC | Age: 31
End: 2021-05-14

## 2021-05-14 ENCOUNTER — HOSPITAL ENCOUNTER (OUTPATIENT)
Dept: BEHAVIORAL HEALTH | Facility: CLINIC | Age: 31
End: 2021-05-14
Attending: PSYCHIATRY & NEUROLOGY
Payer: COMMERCIAL

## 2021-05-14 PROCEDURE — G0177 OPPS/PHP; TRAIN & EDUC SERV: HCPCS | Mod: GT

## 2021-05-14 PROCEDURE — 90853 GROUP PSYCHOTHERAPY: CPT | Mod: GT | Performed by: COUNSELOR

## 2021-05-14 NOTE — GROUP NOTE
Psychoeducation Group Note    PATIENT'S NAME: Dania Cutler  MRN:   [id number]  :   1990  ACCT. NUMBER: [id number]  DATE OF SERVICE: 21  START TIME:  9:00 AM  END TIME:  9:50 AM  FACILITATOR: Harrison Moses OT  TOPIC: MH Life Skills Group: Sensory Approaches in Mental Health  [facility] Adult Mental Health Day Treatment  TRACK: 2B    Telemedicine Visit: The patient's condition can be safely assessed and treated via synchronous audio and visual telemedicine encounter.      Reason for Telemedicine Visit: Services only offered telehealth and Covid 19    Originating Site (Patient Location): Patient's home    Distant Site (Provider Location): Provider Remote Setting    Consent:  The patient/guardian has verbally consented to: the potential risks and benefits of telemedicine (video visit) versus in person care; bill my insurance or make self-payment for services provided; and responsibility for payment of non-covered services.     Mode of Communication:  Video Conference via Medical Zoom    As the provider I attest to compliance with applicable laws and regulations related to telemedicine.      NUMBER OF PARTICIPANTS: 5    Summary of Group / Topics Discussed:  Sensory Approaches in Mental Health:  Focused Activity: Patients were provided with verbal and experiential education to identify physical and sensorimotor based activities that can be utilized for stress management, self care, health maintenance, and self regulation.  Patients increased knowledge and awareness of activities that support good self care, build resiliency, and prevent relapse through healthy engagement in mindful focused activities for effective coping with illness and reduction of maladaptive coping skills.     Patient Session Goals / Objectives:    Identified specific physical and sensorimotor based activities for stress management, self care, health maintenance, and self regulation      Improved awareness of activities that are  meaningful focused activities that support good self care, build resiliency, and prevent relapse and how this applies to current daily life    Established a plan for practice of these skills in their own environments    Practiced and reflected on how to generalize taught skills to their everyday life      Patient Participation / Response:  Fully participated with the group by sharing personal reflections / insights and openly received / provided feedback with other participants.    Verbalized understanding of content and Patient would benefit from additional opportunities to practice the content to be able to generalize it to their everyday life with increased intentionality, consistency, and efficacy in support of their psychiatric recovery    Treatment Plan:  Patient has an initial individualized treatment plan that was created as part of their diagnostic assessment / admission process.  A master individualized treatment plan is in the process of being developed with the patient and multi-disciplinary care team.    Harrison Moses, OT

## 2021-05-14 NOTE — TELEPHONE ENCOUNTER
Writer called to conduct health screen. Pt unavailable. Scheduled for Monday.  Dasia Irby RN on 5/14/2021 at 4:27 PM

## 2021-05-14 NOTE — GROUP NOTE
Process Group Note    PATIENT'S NAME: Dania Cutler  MRN:   [id number]  :   1990  ACCT. NUMBER: [id number]  DATE OF SERVICE: 21  START TIME: 10:00 AM  END TIME: 10:50 AM  FACILITATOR: Darlin Krishna LPCC  TOPIC:  Process Group    Diagnoses:   296.33 (F33.2) Major Depressive Disorder, Recurrent Episode, Severe _.  4. Other Diagnoses that is relevant to services:   Substance-Related & Addictive Disorders 304.30 (F12.20) Cannabis Use Disorder Severe.       [facility] Mental Health Day Treatment  TRACK: 2B    NUMBER OF PARTICIPANTS: 6    Telemedicine Visit: The patient's condition can be safely assessed and treated via synchronous audio and visual telemedicine encounter.      Reason for Telemedicine Visit: Services only offered telehealth and due to COVID-19    Originating Site (Patient Location): Patient's home    Distant Site (Provider Location): Provider Remote Setting    Consent:  The patient/guardian has verbally consented to: the potential risks and benefits of telemedicine (video visit) versus in person care; bill my insurance or make self-payment for services provided; and responsibility for payment of non-covered services.     Mode of Communication:  Video Conference via Zoom    As the provider I attest to compliance with applicable laws and regulations related to telemedicine.           Data:    Session content: At the start of this group, patients were invited to check in by identifying themselves, describing their current emotional status, and identifying issues to address in this group.   Area(s) of treatment focus addressed in this session included Symptom Management, Personal Safety and Community Resources/Discharge Planning.    Patient processed feeling less anxious. Patient stated she felt proud that she ate breakfast today and continues to struggle with feeling hungry and sleeping well without marijuana. Patient reported her children were home with her today and that was a  stressor. Patient reported feeling motivated and hopeful for a new medications.     Therapeutic Interventions/Treatment Strategies:  Psychotherapist offered support, feedback and validation and reinforced use of skills. Treatment modalities used include Motivational Interviewing and Cognitive Behavioral Therapy. Interventions include Coping Skills: Assisted patient in identifying 1-2 healthy distraction skills to reduce overall distress, Symptoms Management: Promoted understanding of their diagnoses and how it impacts their functioning and Emotions Management:  Discussed barriers to emotional regulation.    Assessment:    Patient response:   Patient responded to session by accepting feedback, giving feedback, listening, focusing on goals, being attentive and accepting support    Possible barriers to participation / learning include: and no barriers identified    Health Issues:   None reported       Substance Use Review:   Substance Use: No active concerns identified.    Mental Status/Behavioral Observations  Appearance:   Appropriate   Eye Contact:   Good   Psychomotor Behavior: Normal   Attitude:   Cooperative   Orientation:   All  Speech   Rate / Production: Normal/ Responsive Normal    Volume:  Normal   Mood:    Anxious  Depressed  Normal  Affect:    Appropriate   Thought Content:   Clear and Safety denies any current safety concerns including suicidal ideation, self-harm, and homicidal ideation  Thought Form:  Coherent  Logical     Insight:    Good     Plan:     Safety Plan: No current safety concerns identified.  Recommended that patient call 911 or go to the local ED should there be a change in any of these risk factors.     Barriers to treatment: None identified    Patient Contracts (see media tab):  None    Substance Use: Provided encouragement towards sobriety     Continue or Discharge: Patient will continue in Adult Day Treatment (ADT)  as planned. Patient is likely to benefit from learning and using  skills as they work toward the goals identified in their treatment plan.      Darlin Krishna, [facility]  May 14, 2021

## 2021-05-17 ENCOUNTER — TELEPHONE (OUTPATIENT)
Dept: BEHAVIORAL HEALTH | Facility: CLINIC | Age: 31
End: 2021-05-17

## 2021-05-17 NOTE — TELEPHONE ENCOUNTER
"RN Review of Medical History / Physical Health Screen  Outpatient Mental Health Programs - [facility] Adult Mental Health Day Treatment    PATIENT'S NAME: Dania Cutler  MRN:   [id number]  :   1990  ACCT. NUMBER: [id number]  CURRENT AGE:  30 year old    DATE OF DIAGNOSTIC ASSESSMENT: 21  DATE OF ADMISSION: 21     Please see Diagnostic Assessment for additional Medical History.     General Health:   Have you had any exposure to any communicable disease in the past 2-3 weeks? no     Are you aware of safe sex practices? yes       Nutrition:    Are you on a special diet? If yes, please explain:  no   Do you have any concerns regarding your nutritional status? If yes, please explain:  no   Have you had any appetite changes in the last 3 months?  Yes, not eating very much, has CHS (has some anxiety meds), starting to feel a little better     Have you had any weight loss or weight gain in the last 3 months?  Yes, how much? Lost some weight (~20 pounds), likely r/t CHS, able to stay hydrated, will go to ED if can't stayed hydrrated     Do you have a history of an eating disorder? no   Do you have a history of being in an eating disorder program? no       Patient height and weight recorded by RN in epic flowsheet: no No; Unable to measure  Because of temporary in-person programmatic suspension due to COVID-19 pandemic, all pt weights and heights will be collected through patient self-report an recorded in physical health screening progress note upon admission to the program.                            Height/Weight Review:  Patient reported height:     5'5\"   Patient reports weight:  Date last checked:  190 pounds   Any referrals/needs identified?                BMI Review:  Was the patient informed of BMI? no      Findings See above         Fall Risk:   Have you had any falls in the past 3 months? no     Do you currently use any assistive devices for mobility?   no      Additional " Comments/Assessment: Pt denies seizures (current/historical), dizziness, mobility concerns. No fall risk assessed; No safety concerns r/t falls.  Has PCP, IT (through [facility] [place]), psychiatry through  coming up    Per completion of the Medical History / Physical Health Screen, is there a recommendation to see / follow up with a primary care physician/clinic or dentist?    No.      Dasia Irby RN  5/17/2021

## 2021-05-18 ENCOUNTER — HOSPITAL ENCOUNTER (OUTPATIENT)
Dept: BEHAVIORAL HEALTH | Facility: CLINIC | Age: 31
End: 2021-05-18
Attending: PSYCHIATRY & NEUROLOGY
Payer: COMMERCIAL

## 2021-05-18 PROCEDURE — G0177 OPPS/PHP; TRAIN & EDUC SERV: HCPCS | Mod: 95

## 2021-05-18 PROCEDURE — 90853 GROUP PSYCHOTHERAPY: CPT | Mod: 95

## 2021-05-18 PROCEDURE — G0177 OPPS/PHP; TRAIN & EDUC SERV: HCPCS | Mod: GT

## 2021-05-18 NOTE — PROGRESS NOTES
Acknowledgement of Current Treatment Plan       I have reviewed my treatment plan with my therapist / counselor on 5/18/2021   I agree with the plan as it is written in the electronic health record. (2B)    Name:      Signature:  Dania Cutler Unable to sign due to Covid 19. Verbal permission obtained on 5/18/2021 12:00 p.m.     Dr Noe Adamson MD  Psychiatrist    Harrison Moses, OT Harrison Moses OT  5/18/2021 1:50 p.m.   HERLINDA Zeng  Psychotherapist Yonathan PATE 5/18/21 2:37 pm

## 2021-05-18 NOTE — GROUP NOTE
Psychoeducation Group Note    PATIENT'S NAME: Dania Ctuler  MRN:   [id number]  :   1990  ACCT. NUMBER: [id number]  DATE OF SERVICE: 21  START TIME: 10:00 AM  END TIME: 10:50 AM  FACILITATOR: Dasia Irby RN  TOPIC: MH RN Group: Health Maintenance  Telemedicine Visit: The patient's condition can be safely assessed and treated via synchronous audio and visual telemedicine encounter.      Reason for Telemedicine Visit:  covid19    Originating Site (Patient Location): Patient's home    Distant Site (Provider Location): Provider Remote Setting    Consent:  The patient/guardian has verbally consented to: the potential risks and benefits of telemedicine (video visit) versus in person care; bill my insurance or make self-payment for services provided; and responsibility for payment of non-covered services.     Mode of Communication:  Video Conference via zoom    As the provider I attest to compliance with applicable laws and regulations related to telemedicine.      [facility] Adult Mental Health Day Treatment  TRACK: 2B    NUMBER OF PARTICIPANTS: 4    Summary of Group / Topics Discussed:  Health Maintenance: Wellness Check-in: Patients met with group facilitator to individually review a holistic wellness check-in to assess patient medication adherence/concerns, appointments, physical and mental health, exercise, nutrition, sleep, socialization, substance use, and need for service/resource referrals.       Patient Session Goals / Objectives:    Discussed various aspects of health management and self-care related to physical and mental health    Demonstrated increased self-awareness of current wellness needs    Developed health literacy skills in navigating the healthcare system and self-advocacy/communicating needs with health care team          Patient Participation / Response:  Fully participated with the group by sharing personal reflections / insights and openly received / provided feedback with  other participants.    Demonstrated understanding of topics discussed through group discussion and participation and Identified / Expressed personal readiness to practice skills    Treatment Plan:  Patient has an initial individualized treatment plan that was created as part of their diagnostic assessment / admission process.  A master individualized treatment plan is in the process of being developed with the patient and multi-disciplinary care team.    Dasia Irby RN

## 2021-05-18 NOTE — GROUP NOTE
Psychoeducation Group Note    PATIENT'S NAME: Dania Cutler  MRN:   [id number]  :   1990  ACCT. NUMBER: [id number]  DATE OF SERVICE: 21  START TIME: 11:00 AM  END TIME: 11:50 AM  FACILITATOR: Harrison Moses OT  TOPIC: MH Life Skills Group: Lifestyle Balance and Structure  [facility] Adult Mental Health Day Treatment  TRACK: 2B    Telemedicine Visit: The patient's condition can be safely assessed and treated via synchronous audio and visual telemedicine encounter.      Reason for Telemedicine Visit: Services only offered telehealth and Covid 19    Originating Site (Patient Location): Patient's home    Distant Site (Provider Location): Provider Remote Setting    Consent:  The patient/guardian has verbally consented to: the potential risks and benefits of telemedicine (video visit) versus in person care; bill my insurance or make self-payment for services provided; and responsibility for payment of non-covered services.     Mode of Communication:  Video Conference via Medical Zoom    As the provider I attest to compliance with applicable laws and regulations related to telemedicine.      NUMBER OF PARTICIPANTS: 3    Summary of Group / Topics Discussed:  Lifestyle Balance and Strucure:  Occupations: Patients were provided with an overview on the importance of daily occupations in support of mental health management.  Patients were assisted to establish, restore, and/or modify performance skills and patterns for improved engagement and promotion of positive mental health through meaningful occupations.  Patients gained awareness of the connection between who they are (self identity) with what they do.    Patient Session Goals / Objectives:    Increased awareness of how patient s functioning in identified meaningful occupations are impacted by their mental health status     Developed performance skills and performance patterns to enhance occupational engagement    Explored ways to generalize new  awareness and skills to their everyday life        Patient Participation / Response:  Fully participated with the group by sharing personal reflections / insights and openly received / provided feedback with other participants.    Verbalized understanding of content and Patient would benefit from additional opportunities to practice the content to be able to generalize it to their everyday life with increased intentionality, consistency, and efficacy in support of their psychiatric recovery    Treatment Plan:  Patient has an initial individualized treatment plan that was created as part of their diagnostic assessment / admission process.  A master individualized treatment plan is in the process of being developed with the patient and multi-disciplinary care team.    Harrison Moses, OT

## 2021-05-18 NOTE — PROGRESS NOTES
Adult Day Treatment Program:  Individualized Treatment Plan       Date of Plan: 2021    Name: Dania Cutler MRN: [id number]    : 1990     Program: Adult Day Treatment Program (ADT)    Clinical Track: 2B    DSM5 Diagnosis:     Diagnoses:   296.33 (F33.2) Major Depressive Disorder, Recurrent Episode, Severe _.  4. Other Diagnoses that is relevant to services:   Substance-Related & Addictive Disorders 304.30 (F12.20) Cannabis Use Disorder Severe.     55+ Multidisciplinary Team Members:  Dr Noe Adamson MD and/or Dr. Dolly De Luna PsyD, Kell Adamson, LPCC, Harrison Moses, MOTR/L, Dasia Irby, RN, BSN    Patient will participate in the Adult Day Treatment Program 3 days per week, 3 hours per day.     Anticipated duration/discharge: 12 weeks    Due to COVID-19, services will be delivered via telemedicine until further notice.     Program Start Date: 2021  Anticipated Discharge Date: On or around 8/10/21 (pending authorization/clinical changes)    NOTE: Complete CGI     Review Date: Does Dania Cutler continue to meet criteria to participate in the ADT Program, 3 days per week; 3 hours per day?   2021 Yes                     Client Strengths:  Being mother, caring, creative, educated, empathetic, good listener, insightful, motivated, open to learning, open to suggestions / feedback, supportive, wants to learn, willing to ask questions and willing to relate to others    Client Participation in Plan:  Contributed to goals and plan   Attended individual treatment plan meeting on 2021  Agrees with plan   Received copy of treatment plan   Discussed with staff     Areas of Vulnerability:  Suicidal Ideation   Depressive symptoms   Trauma/Abuse/Neglect  Substance use     Long-Term Goals:  Knowledge about illness and management of symptoms   Maintenance of personal safety     Abuse Prevention Plan:  Safe, therapeutic environment   Safety coping plan as needed   Education regarding illness  and skill development   Coordination with care providers     Discharge Criteria:  Satisfactory progress toward treatment goals   Improvement re: identified problems and symptoms   Ability to continue recovery at next level of service   Has a discharge plan in place   Regular attendance as scheduled     Areas of Treatment Focus        Area of Treatment Focus:   Personal Safety  Start Date:  5/18/2021    Goal:  Target Date: 7/13/2021,  discharge Status: Stopped  Client will notify staff when needing assistance to develop or implement a coping plan to manage suicidal or self injurious urges.  Client will use coping plan for safety, as needed.      Progress:  5/18/2021:Patient met with staff. Discussed program, process, progress. Discussed and set treatment goals. Sometimes SI. Has been pretty not there lately. Always reach out when it happens.   6/2/2021:  Withdrew due to feeling she has too much on her plate at this time with other services, plans to return when able.      Treatment Strategies:   Assist clients in establishing / strengthening support network  Assist to identify treatment goals  Engage in safety planning when indicated  Facilitate increased self awareness        Area of Treatment Focus:   Symptom Stabilization and Management  Start Date:    5/18/2021    Goal:  Target Date: 7/13/2021, discharge Status: Stopped    In psychotherapy groups Dania will:  Will work towards understanding and applying emotional regulation skills/strategies.  Process ways to be begin grieving the loss of her sister in healthy ways.   Identify ways to honor herself by identifying and sticking to healthy boundaries.       Progress:  5/18/2021:Patient met with staff. Discussed program, process, progress. Discussed and set treatment goals. Staff [facility] to [facility] for [facility] support [facility]. Patent in agreement to goals moving forward.   6/2/2021:  Withdrew due to feeling she has too much on her plate at  this time with other services, plans to return when able.      Treatment Strategies:   Assist clients in establishing / strengthening support network  Assist to identify treatment goals  Assist with discharge planning  Assess / reassess level of potential for harm to self or others  Engage in safety planning when indicated  Facilitate increased self awareness  Provide education regarding wise mind, radical acceptance, self compassion, mindfulness, disorted thinking, distress tolerance, emotions management, cogntiive defusion, self soothing, behavior chain analysis.   Provide feedback about social skills        Area of Treatment Focus:   Lifestyle Health & Participation  Start Date:    5/18/2021    Goal:  Target Date: 7/13/2021 Status: Stopped    Dania will:     Work to identify and explore 2 meaningful leisure pursuits or creative activities to develop life balance in of support mental health management and recovery.     Learn, review, apply, generalize 1 sensory/body based self regulation skill based on the development of her self awareness around her bodily responses to support participation in meaningful roles such as parenting.       Progress:   5/18/2021: Patient met with staff. Discussed program, process, progress. Discussed and set treatment goals. Patient agrees to goals.   6/2/2021:  Withdrew due to feeling she has too much on her plate at this time with other services, plans to return when able.      Treatment Strategies:   Assist clients in establishing / strengthening support network  Assist to identify treatment goals  Facilitate increased self awareness  Provide education regarding self regulation, time management, social participation, benefits of focused activity, lifestyle balance, stress management, values identification, leisure, career/work/school related consderations, sensory modulation,   Teach adaptive coping skills and communication skills        Area of Treatment Focus:   Community  "Resources / Support and Discharge Planning  Start Date:    5/18/2021    Goal:  Target Date: 7/13/2021, discharge Status: Stopped    Will improve wellness related behaviors by finding healthy alternatives to using \"weed to cope\" to help her feel more healthy on a daily basis.    Will develop an aftercare / transition plan by discharge date.       Progress:   5/18/2021:Patient met with staff. Discussed program, process, progress. Discussed and set treatment goals. Did a couple months of DBT.  Starts a new one in June.   6/2/2021:  Withdrew due to feeling she has too much on her plate at this time with other services, plans to return when able.      Treatment Strategies:   Assist clients in establishing / strengthening support network  Assist to identify treatment goals  Assist with discharge planning  Engage in safety planning when indicated  Facilitate increased self awareness  Provide education regarding medication management, physical activity, nutrition, sleep hygiene, relapse management, red flags, community resources.          Harrison Moses OT      NOTE: Required signatures are completed manually and scanned into the electronic medical record. See \"Media\" tab in epic.    The Individualized Treatment Plan Signature Page has been routed to the provider for co-sign.          "

## 2021-05-18 NOTE — GROUP NOTE
Process Group Note    PATIENT'S NAME: Dania Cutler  MRN:   [id number]  :   1990  ACCT. NUMBER: [id number]  DATE OF SERVICE: 21  START TIME:  9:00 AM  END TIME:  9:50 AM  FACILITATOR: Kell Adamson  TOPIC:  Process Group    Diagnoses:   296.33 (F33.2) Major Depressive Disorder, Recurrent Episode, Severe _.  4. Other Diagnoses that is relevant to services:   Substance-Related & Addictive Disorders 304.30 (F12.20) Cannabis Use Disorder Severe.      [facility] Mental Health Day Treatment  TRACK: 2B    Telemedicine Visit: The patient's condition can be safely assessed and treated via synchronous audio and visual telemedicine encounter.      Reason for Telemedicine Visit:  covid 19    Originating Site (Patient Location): Patient's home    Distant Site (Provider Location): Provider Remote Setting    Consent:  The patient/guardian has verbally consented to: the potential risks and benefits of telemedicine (video visit) versus in person care; bill my insurance or make self-payment for services provided; and responsibility for payment of non-covered services.     Mode of Communication:  Video Conference via [employer]    As the provider I attest to compliance with applicable laws and regulations related to telemedicine.      NUMBER OF PARTICIPANTS: 4          Data:    Session content: At the start of this group, patients were invited to check in by identifying themselves, describing their current emotional status, and identifying issues to address in this group.   Area(s) of treatment focus addressed in this session included Symptom Management, Personal Safety, Develop / Improve Independent Living Skills and Develop Socialization / Interpersonal Relationship Skills.  Dania reported feeling blank the last few days.  She reported she has been cutting out toxic people in her life which is good but she is second guessing it some as it has left her lonely.  She reported knowing it was the right  decision ultimately.  She reported feelign exhausted from being a mom and agreed that when her boyfriend visits soon she might be able to take a break to attend to self care.  She reported having low energy and anhedonia.  She reported starting medications last week and is communicating better with boyfriend so feels things are going well despite high symptoms.  She reported plans to meet with psychiatrist later this week. She did not endorse safety concerns.     Therapeutic Interventions/Treatment Strategies:  Psychotherapist offered support, feedback and validation and reinforced use of skills. Treatment modalities used include Motivational Interviewing, Cognitive Behavioral Therapy and Dialectical Behavioral Therapy.  Validated and normalized.  Highlighted and reinforced skills used; connected to positive outcomes.  Provided additional skill suggestions.  Aided in creating a plan to help work towards goals.      Assessment:    Patient response:   Patient responded to session by accepting feedback, giving feedback, listening, focusing on goals, being attentive and accepting support    Possible barriers to participation / learning include: and no barriers identified    Health Issues:   None reported       Substance Use Review:   Substance Use: No active concerns identified.    Mental Status/Behavioral Observations  Appearance:   Appropriate   Eye Contact:   Good   Psychomotor Behavior: Normal   Attitude:   Cooperative   Orientation:   All  Speech   Rate / Production: Normal    Volume:  Normal   Mood:    Anxious  Depressed   Affect:    Appropriate   Thought Content:   Rumination  Thought Form:  Coherent  Logical     Insight:    Good     Plan:     Safety Plan: No current safety concerns identified.  Recommended that patient call 911 or go to the local ED should there be a change in any of these risk factors.     Barriers to treatment: None identified    Patient Contracts (see media tab):  None    Substance Use: Not  addressed in session     Continue or Discharge: Patient will continue in Adult Day Treatment (ADT)  as planned. Patient is likely to benefit from learning and using skills as they work toward the goals identified in their treatment plan.      Kell Adamson  May 18, 2021

## 2021-05-19 ENCOUNTER — HOSPITAL ENCOUNTER (OUTPATIENT)
Dept: BEHAVIORAL HEALTH | Facility: CLINIC | Age: 31
End: 2021-05-19
Attending: PSYCHIATRY & NEUROLOGY
Payer: COMMERCIAL

## 2021-05-19 PROCEDURE — 90853 GROUP PSYCHOTHERAPY: CPT | Mod: 95

## 2021-05-19 PROCEDURE — G0177 OPPS/PHP; TRAIN & EDUC SERV: HCPCS | Mod: GT

## 2021-05-19 PROCEDURE — 90853 GROUP PSYCHOTHERAPY: CPT | Mod: GT

## 2021-05-19 NOTE — GROUP NOTE
Psychotherapy Group Note    PATIENT'S NAME: Dania Cutler  MRN:   [id number]  :   1990  ACCT. NUMBER: [id number]  DATE OF SERVICE: 21  START TIME: 11:00 AM  END TIME: 11:50 AM  FACILITATOR: Kell Adamson  TOPIC: MH EBP Group: Coping Skills  [facility] Adult Mental Health Day Treatment  TRACK: 2B    Telemedicine Visit: The patient's condition can be safely assessed and treated via synchronous audio and visual telemedicine encounter.      Reason for Telemedicine Visit:  covid 19    Originating Site (Patient Location): Patient's home    Distant Site (Provider Location): Provider Remote Setting    Consent:  The patient/guardian has verbally consented to: the potential risks and benefits of telemedicine (video visit) versus in person care; bill my insurance or make self-payment for services provided; and responsibility for payment of non-covered services.     Mode of Communication:  Video Conference via [employer]    As the provider I attest to compliance with applicable laws and regulations related to telemedicine.      NUMBER OF PARTICIPANTS: 5    Summary of Group / Topics Discussed:  Coping Skills: Improve the Moment: Patients learned to tolerate distress, by applying strategies to effect positive change in the present moment.  Patients will identified situations where they would benefit from applying strategies to improve the moment and reduce distress. Patients discussed how to distinguish when this can be useful in their lives or when other strategies would be more relevant or helpful.    Patient Session Goals / Objectives:    Discuss how the use of intentional  in the moment  actions can help reduce distress.    Review patients current practices and discuss a more formal way of practicing and accessing skills.    Increase ability to decide when to use improve the moment strategies    Choose 1-2 in the moment actions to apply during times of distress.        Patient Participation /  Response:  Fully participated with the group by sharing personal reflections / insights and openly received / provided feedback with other participants.    Demonstrated understanding of topics discussed through group discussion and participation, Expressed understanding of the relevance / importance of coping skills at distressing times in life and Demonstrated knowledge of when to consider using a variety of coping skills in daily life    Treatment Plan:  Patient has a current master individualized treatment plan.  See Epic treatment plan for more information.    Kell Adamson

## 2021-05-19 NOTE — GROUP NOTE
Process Group Note    PATIENT'S NAME: Dania Cutler  MRN:   [id number]  :   1990  ACCT. NUMBER: [id number]  DATE OF SERVICE: 21  START TIME: 10:00 AM  END TIME: 10:50 AM  FACILITATOR: Kell Adamson  TOPIC:  Process Group    Diagnoses:   296.33 (F33.2) Major Depressive Disorder, Recurrent Episode, Severe _.  4. Other Diagnoses that is relevant to services:   Substance-Related & Addictive Disorders 304.30 (F12.20) Cannabis Use Disorder Severe.      [facility] Mental Health Day Treatment  TRACK: 2B    Telemedicine Visit: The patient's condition can be safely assessed and treated via synchronous audio and visual telemedicine encounter.      Reason for Telemedicine Visit:  covid 19    Originating Site (Patient Location): Patient's home    Distant Site (Provider Location): Provider Remote Setting    Consent:  The patient/guardian has verbally consented to: the potential risks and benefits of telemedicine (video visit) versus in person care; bill my insurance or make self-payment for services provided; and responsibility for payment of non-covered services.     Mode of Communication:  Video Conference via [employer]    As the provider I attest to compliance with applicable laws and regulations related to telemedicine.      NUMBER OF PARTICIPANTS: 5          Data:    Session content: At the start of this group, patients were invited to check in by identifying themselves, describing their current emotional status, and identifying issues to address in this group.   Area(s) of treatment focus addressed in this session included Symptom Management, Personal Safety, Develop / Improve Independent Living Skills and Develop Socialization / Interpersonal Relationship Skills.  Dania reported feeling a little less blah than before.  She reported having her boyfriend around is helpful as she feels they are doing well right now and gives her some breaks.  However, she also reported that having him around  increases urges to smoke as that is an activity they have done together in the past.  She reports she has not used and reminding herself of how sick she gets when she does is helpful.  She reported she has been eating more again which has also been helpful.  Having a schedule with group and routine around getting up and getting ready has improved mood.  She notices self being more calm and patient with her kids.     Therapeutic Interventions/Treatment Strategies:  Psychotherapist offered support, feedback and validation and reinforced use of skills. Treatment modalities used include Motivational Interviewing, Cognitive Behavioral Therapy and Dialectical Behavioral Therapy. Validated and normalized.  Highlighted and reinforced skills used; connected to positive outcomes.  Provided additional skill suggestions.  Aided in creating a plan to help work towards goals.        Assessment:    Patient response:   Patient responded to session by accepting feedback, giving feedback, listening, focusing on goals, being attentive and accepting support    Possible barriers to participation / learning include: and no barriers identified    Health Issues:   None reported       Substance Use Review:   Substance Use: No active concerns identified.    Mental Status/Behavioral Observations  Appearance:   Appropriate   Eye Contact:   Good   Psychomotor Behavior: Normal   Attitude:   Cooperative   Orientation:   All  Speech   Rate / Production: Normal    Volume:  Normal   Mood:    Anxious  Depressed  Normal  Affect:    Appropriate   Thought Content:   Clear  Thought Form:  Coherent  Logical     Insight:    Good     Plan:     Safety Plan: No current safety concerns identified.  Recommended that patient call 911 or go to the local ED should there be a change in any of these risk factors.     Barriers to treatment: None identified    Patient Contracts (see media tab):  None    Substance Use: Not addressed in session     Continue or  Discharge: Patient will continue in Adult Day Treatment (ADT)  as planned. Patient is likely to benefit from learning and using skills as they work toward the goals identified in their treatment plan.      Kell Adamson  May 19, 2021

## 2021-05-19 NOTE — GROUP NOTE
Psychoeducation Group Note    PATIENT'S NAME: Dania Cutler  MRN:   [id number]  :   1990  ACCT. NUMBER: [id number]  DATE OF SERVICE: 21  START TIME:  9:00 AM  END TIME:  9:50 AM  FACILITATOR: Harrison Moses OT  TOPIC: MH Life Skills Group: Sensory Approaches in Mental Health  [facility] Adult Mental Health Day Treatment  TRACK: 2B    Telemedicine Visit: The patient's condition can be safely assessed and treated via synchronous audio and visual telemedicine encounter.      Reason for Telemedicine Visit: Services only offered telehealth and Covid 19    Originating Site (Patient Location): Patient's home    Distant Site (Provider Location): Provider Remote Setting    Consent:  The patient/guardian has verbally consented to: the potential risks and benefits of telemedicine (video visit) versus in person care; bill my insurance or make self-payment for services provided; and responsibility for payment of non-covered services.     Mode of Communication:  Video Conference via Medical Zoom    As the provider I attest to compliance with applicable laws and regulations related to telemedicine.      NUMBER OF PARTICIPANTS: 5    Summary of Group / Topics Discussed:  Sensory Approaches in Mental Health:  Coping Through the Senses Introduction (External sensory systems): Patients were introduced and taught about neurosensory based skills and strategies related to supporting effective self regulation.  Patients were taught/reviewed about the 5 external sensory systems and how they can be used for coping with mental health symptoms and stressors.  Explored the concepts of neurological thresholds, sensory preference and patterns and sensory modulation in the context of trauma responses. Patients were provided with an experiential opportunity to increase self-awareness of helpful sensory input and applying them for self regulation in specific contexts. Patients were introduced on how to create supportive  environments that encourage use of these skills.         Patient Session Goals / Objectives:    Identified specific and individualized neurosensory skills to help when distressed      Identified skills learned and how this applies to current daily life    Established a plan for practice of these skills in their own environments        Patient Participation / Response:  Fully participated with the group by sharing personal reflections / insights and openly received / provided feedback with other participants.    Verbalized understanding of content and Patient would benefit from additional opportunities to practice the content to be able to generalize it to their everyday life with increased intentionality, consistency, and efficacy in support of their psychiatric recovery    Treatment Plan:  Patient has a current master individualized treatment plan.  See Epic treatment plan for more information.    Harrison Moses, OT

## 2021-05-19 NOTE — PROGRESS NOTES
Patient Active Problem List   Diagnosis     Supervision of other high-risk pregnancy     Vaginal bleeding     Mild dysplasia of cervix     Noncompliant pregnant patient     Need for prophylactic vaccination and inoculation against influenza (FLU)     Need for Tdap vaccination     Low TSH level     Low-lying placenta     Drug use     Bipolar affective disorder, currently active (H)     TERI (generalized anxiety disorder)     Major depressive disorder, recurrent episode, moderate (H)     Major depressive disorder, recurrent episode, severe (H)       Current Outpatient Medications:      amitriptyline (ELAVIL) 25 MG tablet, Take 1 tablet (25 mg) by mouth At Bedtime, Disp: 30 tablet, Rfl: 0     capsaicin-menthol-methyl sal 0.025-1-12 % external cream, Apply to abdomen as needed., Disp: 60 g, Rfl: 1     multivitamin w/minerals (MULTI-VITAMIN) tablet, Take 1 tablet by mouth daily, Disp: , Rfl:   Psychiatry staffing: case discussed  Diagnosis:  As above;  Probably the Bipolar is most accurate.  No mood leveling meds listed.  Single parent of 4.

## 2021-05-20 ENCOUNTER — IMMUNIZATION (OUTPATIENT)
Dept: NURSING | Facility: CLINIC | Age: 31
End: 2021-05-20
Payer: COMMERCIAL

## 2021-05-20 ENCOUNTER — VIRTUAL VISIT (OUTPATIENT)
Dept: FAMILY MEDICINE | Facility: CLINIC | Age: 31
End: 2021-05-20
Payer: COMMERCIAL

## 2021-05-20 DIAGNOSIS — G47.09 OTHER INSOMNIA: ICD-10-CM

## 2021-05-20 DIAGNOSIS — R53.83 OTHER FATIGUE: ICD-10-CM

## 2021-05-20 DIAGNOSIS — R11.0 NAUSEA: Primary | ICD-10-CM

## 2021-05-20 DIAGNOSIS — F12.10 MARIJUANA ABUSE: ICD-10-CM

## 2021-05-20 PROCEDURE — 99213 OFFICE O/P EST LOW 20 MIN: CPT | Mod: 95 | Performed by: NURSE PRACTITIONER

## 2021-05-20 PROCEDURE — 0001A PR COVID VAC PFIZER DIL RECON 30 MCG/0.3 ML IM: CPT

## 2021-05-20 PROCEDURE — 91300 PR COVID VAC PFIZER DIL RECON 30 MCG/0.3 ML IM: CPT

## 2021-05-20 RX ORDER — AMITRIPTYLINE HYDROCHLORIDE 50 MG/1
50 TABLET ORAL AT BEDTIME
Qty: 30 TABLET | Refills: 0 | Status: SHIPPED | OUTPATIENT
Start: 2021-05-20

## 2021-05-20 ASSESSMENT — ANXIETY QUESTIONNAIRES
7. FEELING AFRAID AS IF SOMETHING AWFUL MIGHT HAPPEN: SEVERAL DAYS
GAD7 TOTAL SCORE: 11
2. NOT BEING ABLE TO STOP OR CONTROL WORRYING: SEVERAL DAYS
7. FEELING AFRAID AS IF SOMETHING AWFUL MIGHT HAPPEN: SEVERAL DAYS
5. BEING SO RESTLESS THAT IT IS HARD TO SIT STILL: SEVERAL DAYS
4. TROUBLE RELAXING: MORE THAN HALF THE DAYS
GAD7 TOTAL SCORE: 11
GAD7 TOTAL SCORE: 11
1. FEELING NERVOUS, ANXIOUS, OR ON EDGE: MORE THAN HALF THE DAYS
6. BECOMING EASILY ANNOYED OR IRRITABLE: MORE THAN HALF THE DAYS
3. WORRYING TOO MUCH ABOUT DIFFERENT THINGS: MORE THAN HALF THE DAYS

## 2021-05-20 ASSESSMENT — PATIENT HEALTH QUESTIONNAIRE - PHQ9
SUM OF ALL RESPONSES TO PHQ QUESTIONS 1-9: 9
10. IF YOU CHECKED OFF ANY PROBLEMS, HOW DIFFICULT HAVE THESE PROBLEMS MADE IT FOR YOU TO DO YOUR WORK, TAKE CARE OF THINGS AT HOME, OR GET ALONG WITH OTHER PEOPLE: SOMEWHAT DIFFICULT
SUM OF ALL RESPONSES TO PHQ QUESTIONS 1-9: 9

## 2021-05-20 NOTE — PATIENT INSTRUCTIONS

## 2021-05-20 NOTE — PROGRESS NOTES
Dania is a 30 year old who is being evaluated via a billable video visit.      How would you like to obtain your AVS? MyChart  If the video visit is dropped, the invitation should be resent by: Text to cell phone: [phone]  Will anyone else be joining your video visit? No      Video Start Time: 1240    Subjective   Dania is a 30 year old who presents for follow up on multiple concerns.     HPI   Nausea/fatigue/insomnia/marijuana abuse: Dania reports that she is doing well. She states that she is taking the Amitriptyline daily at bedtime and does feel it is helping her cope with the withdrawal she is feeling since cutting down her marijuana use. She has not tried the Capsaicin or hot showers. She states that once she cut down her use of marijuana, she was sick for two days and has now been better. She is eating and drinking more without the troubling nausea. She is attending outpatient treatment. She reports that she has smoked marijuana 3 times since her last visit with me with is drastically less that what she had been doing when she could not go for more than 2 hours without smoking. She is working on 5 healthy habits. She is interested in having some lab work completed to assess her wellness status.     Review of Systems   Constitutional, HEENT, cardiovascular, pulmonary, gi and gu systems are negative, except as otherwise noted.      Objective         Vitals:  No vitals were obtained today due to virtual visit.    Physical Exam   GENERAL: Healthy, alert and no distress  EYES: Eyes grossly normal to inspection.  No discharge or erythema, or obvious scleral/conjunctival abnormalities.  RESP: No audible wheeze, cough, or visible cyanosis.  No visible retractions or increased work of breathing.    SKIN: Visible skin clear. No significant rash, abnormal pigmentation or lesions.  NEURO: Cranial nerves grossly intact.  Mentation and speech appropriate for age.  PSYCH: Mentation appears normal, affect  normal/bright, judgement and insight intact, normal speech and appearance well-groomed.    Labs pending.     Video-Visit Details    Type of service:  Video Visit    Video End Time:1255    Originating Location (pt. Location): Home     Provider's location: Provider's home    Platform used for Video Visit: Sushila     Assessment & Plan     Nausea    - TSH; Future  - Vitamin D Deficiency; Future  - CBC with platelets; Future  - Comprehensive metabolic panel; Future  - Vitamin B12; Future  - amitriptyline (ELAVIL) 50 MG tablet; Take 1 tablet (50 mg) by mouth At Bedtime    Other fatigue    - TSH; Future  - Vitamin D Deficiency; Future  - CBC with platelets; Future  - Comprehensive metabolic panel; Future  - Vitamin B12; Future  - amitriptyline (ELAVIL) 50 MG tablet; Take 1 tablet (50 mg) by mouth At Bedtime    Other insomnia    - TSH; Future  - Vitamin D Deficiency; Future  - CBC with platelets; Future  - Comprehensive metabolic panel; Future  - Vitamin B12; Future  - amitriptyline (ELAVIL) 50 MG tablet; Take 1 tablet (50 mg) by mouth At Bedtime    Marijuana abuse    - THC Confirmation Quantitative Urine; Future  - amitriptyline (ELAVIL) 50 MG tablet; Take 1 tablet (50 mg) by mouth At Bedtime    Return in about 2 weeks (around 6/3/2021) for using a video visit.    First, have labs completed. Next, increase Amitriptyline from 25 mg po q hs to 50 mg po q hs. Next, continue with treatment for addiction. Next, follow up on status in 2 weeks. She verbalizes understanding of the plan.   Ani Duarte, APRN, CNP

## 2021-05-21 ENCOUNTER — HOSPITAL ENCOUNTER (OUTPATIENT)
Dept: BEHAVIORAL HEALTH | Facility: CLINIC | Age: 31
End: 2021-05-21
Attending: PSYCHIATRY & NEUROLOGY
Payer: COMMERCIAL

## 2021-05-21 PROCEDURE — G0177 OPPS/PHP; TRAIN & EDUC SERV: HCPCS | Mod: GT

## 2021-05-21 PROCEDURE — G0177 OPPS/PHP; TRAIN & EDUC SERV: HCPCS | Mod: 95

## 2021-05-21 PROCEDURE — 90853 GROUP PSYCHOTHERAPY: CPT | Mod: 95

## 2021-05-21 ASSESSMENT — ANXIETY QUESTIONNAIRES: GAD7 TOTAL SCORE: 11

## 2021-05-21 ASSESSMENT — PATIENT HEALTH QUESTIONNAIRE - PHQ9: SUM OF ALL RESPONSES TO PHQ QUESTIONS 1-9: 9

## 2021-05-21 NOTE — GROUP NOTE
Psychoeducation Group Note    PATIENT'S NAME: Dania Cutler  MRN:   [id number]  :   1990  ACCT. NUMBER: [id number]  DATE OF SERVICE: 21  START TIME:  9:00 AM  END TIME:  9:50 AM  FACILITATOR: Harrison Moses OT  TOPIC: MH Life Skills Group: Sensory Approaches in Mental Health  [facility] Adult Mental Health Day Treatment  TRACK: 2B    Telemedicine Visit: The patient's condition can be safely assessed and treated via synchronous audio and visual telemedicine encounter.      Reason for Telemedicine Visit: Services only offered telehealth and Covid 19    Originating Site (Patient Location): Patient's home    Distant Site (Provider Location): Provider Remote Setting    Consent:  The patient/guardian has verbally consented to: the potential risks and benefits of telemedicine (video visit) versus in person care; bill my insurance or make self-payment for services provided; and responsibility for payment of non-covered services.     Mode of Communication:  Video Conference via Medical Zoom    As the provider I attest to compliance with applicable laws and regulations related to telemedicine.      NUMBER OF PARTICIPANTS: 5    Summary of Group / Topics Discussed:  Sensory Approaches in Mental Health:  Focused Activity: Patients were provided with verbal and experiential education to identify physical and sensorimotor based activities that can be utilized for stress management, self care, health maintenance, and self regulation.  Patients increased knowledge and awareness of activities that support good self care, build resiliency, and prevent relapse through healthy engagement in mindful focused activities for effective coping with illness and reduction of maladaptive coping skills.     Patient Session Goals / Objectives:    Identified specific physical and sensorimotor based activities for stress management, self care, health maintenance, and self regulation      Improved awareness of activities that are  meaningful focused activities that support good self care, build resiliency, and prevent relapse and how this applies to current daily life    Established a plan for practice of these skills in their own environments    Practiced and reflected on how to generalize taught skills to their everyday life      Patient Participation / Response:  Fully participated with the group by sharing personal reflections / insights and openly received / provided feedback with other participants.    Verbalized understanding of content and Patient would benefit from additional opportunities to practice the content to be able to generalize it to their everyday life with increased intentionality, consistency, and efficacy in support of their psychiatric recovery    Treatment Plan:  Patient has a current master individualized treatment plan.  See Epic treatment plan for more information.    Harrison Moses, OT
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
## 2021-05-21 NOTE — GROUP NOTE
Psychoeducation Group Note    PATIENT'S NAME: Dania Cutler  MRN:   [id number]  :   1990  ACCT. NUMBER: [id number]  DATE OF SERVICE: 21  START TIME: 11:00 AM  END TIME: 11:50 AM  FACILITATOR: Dasia Irby RN  TOPIC: PENG RN Group: Health Maintenance  Telemedicine Visit: The patient's condition can be safely assessed and treated via synchronous audio and visual telemedicine encounter.      Reason for Telemedicine Visit:  covid19    Originating Site (Patient Location): Patient's home    Distant Site (Provider Location): Provider Remote Setting    Consent:  The patient/guardian has verbally consented to: the potential risks and benefits of telemedicine (video visit) versus in person care; bill my insurance or make self-payment for services provided; and responsibility for payment of non-covered services.     Mode of Communication:  Video Conference via zoom    As the provider I attest to compliance with applicable laws and regulations related to telemedicine.      [facility] Adult Mental Health Day Treatment  TRACK: 2B    NUMBER OF PARTICIPANTS: 5    Summary of Group / Topics Discussed:  Health Maintenance: Discharge planning/Community resources: Patients worked on completing an instructor-facilitated discharge planning activity. Discharge planning begins for all patients after admission. Competent discharge planning promotes a successful transition and decreases the likelihood of mental health relapse. In this group, all dimensions of wellness were reviewed to assess for needs/discharge readiness. These dimensions included: physical, emotional, occupational/productivity, environmental, social, spiritual, intellectual, and financial. Patients worked on completing/updating their discharge planning and identifying their treatment needs prior to time of discharge.     Patient Session Goals / Objectives:  ? Identified unmet treatment needs to accomplish before discharge  ? Completed all dimensions of  the discharge planning packet  ? Participated in the planning process, make phone calls, set up appointments, got connected with community resources, followed up with treatment team as needed         Patient Participation / Response:  Fully participated with the group by sharing personal reflections / insights and openly received / provided feedback with other participants.    Demonstrated understanding of topics discussed through group discussion and participation and Identified / Expressed personal readiness to practice skills    Treatment Plan:  Patient has a current master individualized treatment plan.  See Epic treatment plan for more information.    Dasia Irby RN

## 2021-05-25 ENCOUNTER — HOSPITAL ENCOUNTER (OUTPATIENT)
Dept: BEHAVIORAL HEALTH | Facility: CLINIC | Age: 31
End: 2021-05-25
Attending: PSYCHIATRY & NEUROLOGY
Payer: COMMERCIAL

## 2021-05-25 PROCEDURE — G0177 OPPS/PHP; TRAIN & EDUC SERV: HCPCS | Mod: 95

## 2021-05-25 PROCEDURE — 90853 GROUP PSYCHOTHERAPY: CPT | Mod: 95

## 2021-05-25 NOTE — GROUP NOTE
Psychoeducation Group Note    PATIENT'S NAME: Dania Cutler  MRN:   [id number]  :   1990  ACCT. NUMBER: [id number]  DATE OF SERVICE: 21  START TIME: 10:00 AM  END TIME: 10:50 AM  FACILITATOR: Dasia Irby RN  TOPIC: MH RN Group: Health Maintenance  Telemedicine Visit: The patient's condition can be safely assessed and treated via synchronous audio and visual telemedicine encounter.      Reason for Telemedicine Visit:  covid19    Originating Site (Patient Location): Patient's home    Distant Site (Provider Location): Provider Remote Setting    Consent:  The patient/guardian has verbally consented to: the potential risks and benefits of telemedicine (video visit) versus in person care; bill my insurance or make self-payment for services provided; and responsibility for payment of non-covered services.     Mode of Communication:  Video Conference via [employer]    As the provider I attest to compliance with applicable laws and regulations related to telemedicine.      [facility] Adult Mental Health Day Treatment  TRACK: 2B    NUMBER OF PARTICIPANTS: 5    Summary of Group / Topics Discussed:  Health Maintenance: Wellness Check-in: Patients met with group facilitator to individually review a holistic wellness check-in to assess patient medication adherence/concerns, appointments, physical and mental health, exercise, nutrition, sleep, socialization, substance use, and need for service/resource referrals.       Patient Session Goals / Objectives:    Discussed various aspects of health management and self-care related to physical and mental health    Demonstrated increased self-awareness of current wellness needs    Developed health literacy skills in navigating the healthcare system and self-advocacy/communicating needs with health care team          Patient Participation / Response:  Fully participated with the group by sharing personal reflections / insights and openly received / provided  feedback with other participants.    Demonstrated understanding of topics discussed through group discussion and participation and Identified / Expressed personal readiness to practice skills    Treatment Plan:  Patient has a current master individualized treatment plan.  See Epic treatment plan for more information.    Dasia Irby RN
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
## 2021-05-26 NOTE — ADDENDUM NOTE
Encounter addended by: Harrison Moses, OT on: 5/26/2021 12:46 PM   Actions taken: Flowsheet accepted

## 2021-05-26 NOTE — ADDENDUM NOTE
Encounter addended by: Harrison Moses, OT on: 5/26/2021 10:15 AM   Actions taken: Flowsheet accepted

## 2021-06-02 ENCOUNTER — TELEPHONE (OUTPATIENT)
Dept: BEHAVIORAL HEALTH | Facility: CLINIC | Age: 31
End: 2021-06-02

## 2021-06-02 NOTE — DISCHARGE SUMMARY
Adult Mental Health Intensive Outpatient Discharge Summary/Instructions      Patient: Dania Cutler         MRN: [id number]   : 92       Age: 30 year old         Sex: female      Admission Date: 3/26/21  Discharge Date: 21  Diagnosis: 296.33 (F33.2) Major Depressive Disorder, Recurrent Episode, Severe _.  4. Other Diagnoses that is relevant to services:   Substance-Related & Addictive Disorders 304.30 (F12.20) Cannabis Use Disorder Severe.     Focus of Treatment / Progress     Personal Safety: At time of discharge Client reported feeling overwhelmed and like she had too many mental health services at this time (on top of other responsibilities and obligations), she did note endorse safety concerns.                  * Follow your safety plan                 * Call crisis lines as needed:     [facility] [phone]                 [facility] [phone]  [facility] [phone]                 [facility] [phone]  [facility] [phone]                [facility] [phone]  [facility] [phone]            National Suicide Prevention [phone]  [facility] [phone]               Suicide Prevention [phone]  [facility] [phone]     Managing symptoms of:  depression and anxiety     Community support[place] [address] ([phone]) [email or web address] [place] Crisis text line( Text [place] to 375293),  [facility]  [place], [place] ([phone])  [facility] [place], [place] ( [phone])  [facility] [address] [place] [address], [address] [phone]        Managing Symptoms and Preventing Relapse     * Go to all of your appointments     * Take all medications as directed.      * Carry a current list if medication with you     * Do not use illicit (street) drugs.  Avoid alcohol     * Report these symptoms to your care team. These are early signs of relapse:     "          Thoughts of suicide              Losing more sleep              Increased confusion              Mood getting worse              Feeling more aggressive              Other:  isolation and rumination      *Use these skills daily:  Talk to someone you trust at least one time weekly, set boundaries and say \"no\", be assertive, act opposite of negative feelings, accept challenges with a positive attitude, exercise at least three times per week for 30 minutes,  get enough sleep, eat healthy foods, get into a good routine     Copy of summary sent to: In Epic My Chart     Follow up with psychiatrist / main caregiver: PCP   Next visit: As needed      Follow up with your therapist: [facility] Xenia Khan    Next visit: Weekyl     Go to group therapy and / or support groups at: [facility] and [facility] [place](DBSA) support groups, Grief support groups, Attempting DBT group and family therapy     See your medical doctor about:  For an annual physical exam or any general wellness or illness as needed.     Other:  Your treatment team appreciates having the opportunity to work with you and wishes you the best.     Client Signature:______unable to sign due to covid 19_______  Date / Time:___6/2/21 12:00 pm________  Staff Signature:________Yonathan Wright MA [facility]________________   Date / Time:___6/2/21 12:00 pm________    "

## 2021-06-02 NOTE — TELEPHONE ENCOUNTER
Writer called Pt today and left a vm message asking her to join group this morning or call in her absence.  Left program phone line.

## 2021-06-02 NOTE — TELEPHONE ENCOUNTER
Spoke with client.  She reported feeling overwhelmed with schedule as she has group, DBT started this week, family therapy started this week, night school started last week, one of her kids got suspended.  She feels she took on too much.  SHe wants to do group but maybe when she has less going on.  She will withdraw at this time and is welcome to return when able.

## 2021-06-10 ENCOUNTER — IMMUNIZATION (OUTPATIENT)
Dept: NURSING | Facility: CLINIC | Age: 31
End: 2021-06-10
Attending: INTERNAL MEDICINE
Payer: COMMERCIAL

## 2021-06-10 PROCEDURE — 91300 PR COVID VAC PFIZER DIL RECON 30 MCG/0.3 ML IM: CPT

## 2021-06-10 PROCEDURE — 0002A PR COVID VAC PFIZER DIL RECON 30 MCG/0.3 ML IM: CPT
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
## 2021-06-13 NOTE — TELEPHONE ENCOUNTER
S:  Jemima called from [place] [phone] requesting pt return to  from 4700 r/t SI.    B:  Pt initially presented for SI with a plan but has SUSAN for COVID.  Pt transferred to [facility] and pt cleared - COVID Negative.  Pt continues to endorse SI.     A:  Vol    R:  Provider called at 11:21am to present for transfer back to [facility] 5500 from 4700.   Provider accepted at 11:21am  Pt placed in que and unit called with disposition at 11: 27am  Unit 4700 Updated with placement/Dr at 11:30

## 2021-06-13 NOTE — TELEPHONE ENCOUNTER
Negative test     Psychiatric associate at [facility] calling for results.    Callie Correia RN  [facility] Triage/refill nurse

## 2021-07-14 ENCOUNTER — VIRTUAL VISIT (OUTPATIENT)
Dept: GASTROENTEROLOGY | Facility: CLINIC | Age: 31
End: 2021-07-14
Payer: COMMERCIAL

## 2021-07-14 DIAGNOSIS — Z53.9 NO SHOW: Primary | ICD-10-CM

## 2021-07-28 ENCOUNTER — TELEPHONE (OUTPATIENT)
Dept: PSYCHIATRY | Facility: CLINIC | Age: 31
End: 2021-07-28

## 2021-07-28 NOTE — TELEPHONE ENCOUNTER
On July 28, 2021, at 7:37 AM, writer called patient at [phone] to confirm Virtual Visit. Writer unable to make contact with patient. Writer left detailed voice message for call back. [phone] left as call back number. Radha Jones, [facility]

## 2021-09-25 ENCOUNTER — HEALTH MAINTENANCE LETTER (OUTPATIENT)
Age: 31
End: 2021-09-25

## 2022-01-15 ENCOUNTER — HEALTH MAINTENANCE LETTER (OUTPATIENT)
Age: 32
End: 2022-01-15

## 2022-01-27 ENCOUNTER — HOSPITAL ENCOUNTER (EMERGENCY)
Facility: CLINIC | Age: 32
Discharge: HOME | End: 2022-01-27
Attending: EMERGENCY MEDICINE | Admitting: EMERGENCY MEDICINE
Payer: COMMERCIAL

## 2022-01-27 VITALS
RESPIRATION RATE: 20 BRPM | OXYGEN SATURATION: 97 % | HEART RATE: 86 BPM | TEMPERATURE: 98.2 F | DIASTOLIC BLOOD PRESSURE: 69 MMHG | SYSTOLIC BLOOD PRESSURE: 113 MMHG

## 2022-01-27 DIAGNOSIS — K08.89 TOOTHACHE: ICD-10-CM

## 2022-01-27 PROCEDURE — 99282 EMERGENCY DEPT VISIT SF MDM: CPT | Performed by: EMERGENCY MEDICINE

## 2022-01-27 PROCEDURE — 99284 EMERGENCY DEPT VISIT MOD MDM: CPT | Performed by: EMERGENCY MEDICINE

## 2022-01-27 RX ORDER — CLINDAMYCIN HCL 150 MG
450 CAPSULE ORAL 3 TIMES DAILY
Qty: 63 CAPSULE | Refills: 0 | Status: SHIPPED | OUTPATIENT
Start: 2022-01-27 | End: 2022-02-03

## 2022-01-27 RX ORDER — ACETAMINOPHEN 325 MG/1
650 TABLET ORAL ONCE
Status: DISCONTINUED | OUTPATIENT
Start: 2022-01-27 | End: 2022-01-27

## 2022-01-27 ASSESSMENT — ENCOUNTER SYMPTOMS
NECK PAIN: 0
CHOKING: 0
ADENOPATHY: 0
SHORTNESS OF BREATH: 0
POLYDIPSIA: 0
CHILLS: 0
NERVOUS/ANXIOUS: 0
VOMITING: 0
SORE THROAT: 0
NAUSEA: 0
NECK STIFFNESS: 0
HEADACHES: 0
COLOR CHANGE: 0
TROUBLE SWALLOWING: 0
WOUND: 0
FEVER: 0
EYE REDNESS: 0

## 2022-01-27 NOTE — ED PROVIDER NOTES
[place] EMERGENCY DEPARTMENT ([facility])  1/27/22    History     Chief Complaint   Patient presents with     Dental Pain     The history is provided by the patient and medical records.     Dania Cutler is a 31-year-old woman with a medical history significant for bipolar affective disorder, anxiety, PTSD, borderline personality disorder, and depression presenting with dental pain.  She states that she has had a toothache for approximately a month.  She saw an emergency dentist in [place] several weeks ago and was placed on antibiotics, however, she finished the course and is still feeling some mild, throbbing, constant, nonradiating pain in her left lower teeth.  She came here for further evaluation.  She did not take any medications for pain relief.  She states she is not looking for any analgesics at this time, however, she is curious if she may need another course of antibiotics.  She does have an [place] [facility]  She denies any fevers or difficulty swallowing.  She denies any neck pain.  No recent dental work.     This part of the medical record was transcribed by Brianna Rodríguez, Medical Scribe, from a dictation done by Arnulfo Kowalski MD.     Past Medical History:   Diagnosis Date     Anxiety      NO ACTIVE PROBLEMS      Obesity        Past Surgical History:   Procedure Laterality Date     TONSILLECTOMY       ZZC RAD RESEC TONSIL/PILLARS         Family History   Problem Relation Age of Onset     Diabetes Mother      Thyroid Disease No family hx of      Asthma No family hx of        Social History     Tobacco Use     Smoking status: Former Smoker     Types: Cigarettes     Smokeless tobacco: Never Used     Tobacco comment: 1/2+  ppd   Substance Use Topics     Alcohol use: No       No current facility-administered medications for this encounter.     Current Outpatient Medications   Medication     clindamycin (CLEOCIN) 150 MG capsule     amitriptyline (ELAVIL) 50 MG  tablet     capsaicin-menthol-methyl sal 0.025-1-12 % external cream     multivitamin w/minerals (MULTI-VITAMIN) tablet        Allergies   Allergen Reactions     Amoxicillin      Ceclor  [Cefaclor]      Septra [Bactrim]      Sulfamethoxazole W/Trimethoprim      Bactrim        Review of Systems   Constitutional: Negative for chills and fever.   HENT: Positive for dental problem. Negative for congestion, sore throat and trouble swallowing.    Eyes: Negative for redness.   Respiratory: Negative for choking and shortness of breath.    Cardiovascular: Negative for chest pain.   Gastrointestinal: Negative for nausea and vomiting.   Endocrine: Negative for polydipsia and polyuria.   Musculoskeletal: Negative for neck pain and neck stiffness.   Skin: Negative for color change and wound.   Allergic/Immunologic: Negative for immunocompromised state.   Neurological: Negative for headaches.   Hematological: Negative for adenopathy.   Psychiatric/Behavioral: Negative for behavioral problems. The patient is not nervous/anxious.    All other systems reviewed and are negative.    A complete review of systems was performed with pertinent positives and negatives noted in the HPI, and all other systems negative.    Physical Exam   BP: 113/69  Pulse: 86  Temp: 98.2  F (36.8  C)  Resp: 20  SpO2: 97 %  Physical Exam  Vitals and nursing note reviewed.   Constitutional:       General: She is not in acute distress.     Appearance: Normal appearance. She is not ill-appearing, toxic-appearing or diaphoretic.   HENT:      Head: Normocephalic and atraumatic.      Nose: Nose normal.      Mouth/Throat:      Mouth: Mucous membranes are moist.      Pharynx: Oropharynx is clear. No oropharyngeal exudate or posterior oropharyngeal erythema.      Comments: Airway is patent.  No elevation of the floor of the mouth.  No trismus.  Tooth #18 is tender to percussion and there is a chipped part of the tooth that is missing.  No evidence of dental  abscess.  Eyes:      Extraocular Movements: Extraocular movements intact.      Conjunctiva/sclera: Conjunctivae normal.   Cardiovascular:      Rate and Rhythm: Normal rate.   Pulmonary:      Effort: Pulmonary effort is normal. No respiratory distress.   Musculoskeletal:         General: Normal range of motion.      Cervical back: Normal range of motion and neck supple. No tenderness.   Lymphadenopathy:      Cervical: No cervical adenopathy.   Skin:     General: Skin is warm.      Coloration: Skin is not jaundiced or pale.      Findings: No erythema.   Neurological:      General: No focal deficit present.      Mental Status: She is alert and oriented to person, place, and time.      Cranial Nerves: No cranial nerve deficit.      Coordination: Coordination normal.   Psychiatric:         Mood and Affect: Mood normal.         Behavior: Behavior normal.         Thought Content: Thought content normal.         Judgment: Judgment normal.         ED Course      Procedures                   No results found for any visits on 01/27/22.  Medications - No data to display     Assessments & Plan (with Medical Decision Making)    31-year-old woman presenting with dental pain.  I do not see any evidence of airway compromise, Pedro's angina, or dental abscess that needs emergent intervention or airway management.  I believe it is reasonable to give the patient prescription for another course of antibiotics and I will give her a prescription for oral clindamycin given that she has a penicillin allergy.  I will refer her to [facility] dental clinic to see if they could see her sooner than March 8.  She agrees with the plan.  She will return if her symptoms worsen.  At this time she is stable for discharge.      This part of the medical record was transcribed by Brianna Rodríguez, Medical Scribe, from a dictation done by Arnulfo Kowalski MD.     I have reviewed the nursing notes. I have reviewed the findings, diagnosis,  plan and need for follow up with the patient.    Discharge Medication List as of 1/27/2022  1:18 PM      START taking these medications    Details   clindamycin (CLEOCIN) 150 MG capsule Take 3 capsules (450 mg) by mouth 3 times daily for 7 days, Disp-63 capsule, R-0, E-Prescribe             Final diagnoses:   Toothache     Brianna MIRANDA, am serving as a trained medical scribe to document services personally performed by Arnulfo Kowalski MD based on the provider's statements to me on January 27, 2022.  This document has been checked and approved by the attending provider.    Meghana MIRANDA Nikola, MD, was physically present and have reviewed and verified the accuracy of this note documented by Brianna Rodríguez, medical scribe.      --  Arnulfo Kowalski MD  [facility] EMERGENCY DEPARTMENT  1/27/2022     Arnulfo Kowalski MD  01/27/22 2803

## 2022-01-27 NOTE — ED TRIAGE NOTES
"31 yr old female ambulatory to triage presenting with \"infection in my tooth and extreme dental pain.\" Pt reports she has been experiencing this pain for about a month but progressively worsening since she ran out of antibiotics. Pt reports she called the dentist for a refill but they wouldn't refill. Pt reports she cannot have the infected tooth removed until March 8th because everywhere is booked. Pt cannot recall which antibiotic she was prescribed.  "

## 2022-01-27 NOTE — DISCHARGE INSTRUCTIONS
Please make an appointment to follow up with Dental - [facility] (phone: [phone]) and Dental - Oral Surgery Clinic (phone: [phone]) in 7-14 days for further evaluation and recommendation.  Please take the prescribed antibiotic as instructed.  If your symptoms significantly worsen please come back to the emergency department.    Many of these clinics offer a sliding fee option for patients that qualify, and see patients on a walk-in or same day basis. Please call each clinic directly. As services, hours, fees and policies vary greatly.    [place]:  [facility]     [phone]  [facility] ([facility]) [phone]  [facility]  [phone]  [facility]      [phone]   [facility] [place] [facility]    [phone]  [facility]  [phone]  [facility] [phone]  [facility]     [phone]  [facility]   [phone]  [facility] (cash only)   [phone]  [facility]    [phone] (adults)          [phone] (children)    [place]:  [facility]     [phone]; [phone]  [facility]     [phone]  [facility]     [phone]  [facility] (free, limited)    [phone]    Multiple Locations:  [facility]       [phone]              *DENTAL PAIN    A crack or cavity in the tooth, which exposes the sensitive inner area of the tooth can cause tooth pain. An infection in the gum or the root of the tooth can cause pain and swelling. The pain is often made worse by drinking hot or cold fluids, or biting on hard foods. Pain may spread from the tooth to the ear or jaw on the same side.  HOME CARE:  Avoid hot and cold foods and liquids since your tooth may be sensitive to temperature changes.  If  "your tooth is chipped or cracked, or if there is a large open cavity, apply OIL OF CLOVES (available over-the-counter in drug stores) directly to the tooth to reduce pain. Some pharmacies carry an over-the-counter \"toothache kit.\" This contains a paste, which can be applied over the exposed tooth to decrease sensitivity. This is only a temporary solution. See a dentist as soon as possible to fix the tooth.  A cold pack on your jaw over the sore area may help reduce pain.  You may use acetaminophen (Tylenol) 650-1000 mg every 6 hours or ibuprofen (Motrin, Advil) 600 mg every 6-8 hours with food to control pain, if you are able to take these medicines. [ NOTE: If you have chronic liver or kidney disease or ever had a stomach ulcer or GI bleeding, talk with your doctor before using these medicines.]  If you have signs of an infection, an antibiotic will be given. Take it as directed.  FOLLOW-UP as directed with a dentist. Your pain may go away with the treatment given. However, only a dentist can fully evaluate and treat the cause and prevent the pain from coming back again.  TOOTHACHE IS A SIGN OF DISEASE IN YOUR TOOTH AND SHOULD BE EXAMINED AND TREATED BY A DENTIST.  GET PROMPT MEDICAL ATTENTION if any of the following occur:  Your face becomes swollen or red  Pain worsens or spreads to the neck  Fever over 101  F (38.3  C)  Unusual drowsiness; headache or stiff neck; weakness or fainting  Pus drains from the tooth  Difficulty swallowing or breathing    [phone] [employer], [address] [place] [address], [place] [address]. All rights reserved. This information is not intended as a substitute for professional medical care. Always follow your healthcare professional's instructions.        "

## 2023-01-07 ENCOUNTER — HEALTH MAINTENANCE LETTER (OUTPATIENT)
Age: 33
End: 2023-01-07

## 2023-04-22 ENCOUNTER — HEALTH MAINTENANCE LETTER (OUTPATIENT)
Age: 33
End: 2023-04-22

## 2024-06-29 ENCOUNTER — HEALTH MAINTENANCE LETTER (OUTPATIENT)
Age: 34
End: 2024-06-29